# Patient Record
Sex: FEMALE | Race: OTHER | HISPANIC OR LATINO | Employment: UNEMPLOYED | URBAN - METROPOLITAN AREA
[De-identification: names, ages, dates, MRNs, and addresses within clinical notes are randomized per-mention and may not be internally consistent; named-entity substitution may affect disease eponyms.]

---

## 2020-03-24 ENCOUNTER — OFFICE VISIT (OUTPATIENT)
Dept: OBGYN CLINIC | Facility: CLINIC | Age: 40
End: 2020-03-24

## 2020-03-24 VITALS — BODY MASS INDEX: 26.36 KG/M2 | WEIGHT: 164 LBS | HEIGHT: 66 IN

## 2020-03-24 DIAGNOSIS — Z34.91 PRENATAL CARE IN FIRST TRIMESTER: Primary | ICD-10-CM

## 2020-03-24 DIAGNOSIS — Z3A.11 11 WEEKS GESTATION OF PREGNANCY: ICD-10-CM

## 2020-03-24 PROCEDURE — 87624 HPV HI-RISK TYP POOLED RSLT: CPT | Performed by: OBSTETRICS & GYNECOLOGY

## 2020-03-24 PROCEDURE — 87491 CHLMYD TRACH DNA AMP PROBE: CPT | Performed by: OBSTETRICS & GYNECOLOGY

## 2020-03-24 PROCEDURE — 87591 N.GONORRHOEAE DNA AMP PROB: CPT | Performed by: OBSTETRICS & GYNECOLOGY

## 2020-03-24 PROCEDURE — 99214 OFFICE O/P EST MOD 30 MIN: CPT | Performed by: OBSTETRICS & GYNECOLOGY

## 2020-03-24 PROCEDURE — G0145 SCR C/V CYTO,THINLAYER,RESCR: HCPCS | Performed by: OBSTETRICS & GYNECOLOGY

## 2020-03-24 NOTE — PROGRESS NOTES
OB/GYN  PRENATAL H&P VISIT  Mert Madrid  3/24/2020  2:30 PM  Dr Rigoberto Roach MD      SUBJECTIVE  Patient is a  at [address] here for initial prenatal H&P  FOB is her spouse, not currently present today  She reports LMP of 2020, giving current EGA 11w2d with JAKE 10/11/202  Patient had first trimester ultrasound completed in [place] on 2020  She was found to be 6w2d that day with JAKE 10/10/2020  Due to < 5 day difference between LMP and ultrasound, working method of dating is LMP  She is currently doing well, has some pelvic pain  She denies vaginal bleeding, leakage, abnormal discharge  Her obstetric history is notable for 3 prior vaginal deliveries  She has three sons aged 25, 12, and 8  Her first two births were in [place], her last one in the [facility]  She reports no complications from them  Her PMH is otherwise noncontributory  PSH includes cholecystectomy  She denies a family history of inheritable conditions such as physical or intellectual disabilities, birth defects, blood disorders, heart or neural tube defects  She denies hx of STD/STI, denies a hx of TB or close contacts with persons with TB  She has never had MRSA  She reports immunity to varicella  She endorses recent travel to [place] - went , returned   Denies contacts with anyone with COVID-19  Denies travel planned in the near future  She denies use of nicotine or recreational drug use  She is not currently employed,   She currently lives with , three sons  Her support system includes her family  She feels safe at home  She denies a history of mental health problems  Review of Systems   All other systems reviewed and are negative  History reviewed  No pertinent past medical history      OB History    Para Term  AB Living   4 3 3 0 0 3   SAB TAB Ectopic Multiple Live Births   0 0 0 0 3      # Outcome Date GA Lbr Maurizio/2nd Weight Sex Delivery Anes PTL Lv   4 Current 3 Term 2010    M Vag-Spont None  JENA   2 Term 2004    M Vag-Spont   JENA   1 Term 1998    M Vag-Spont   JENA       No current outpatient medications on file  Past Surgical History:   Procedure Laterality Date    CHOLECYSTECTOMY         Social History     Socioeconomic History    Marital status: /Civil Union     Spouse name: Not on file    Number of children: Not on file    Years of education: Not on file    Highest education level: Not on file   Occupational History    Not on file   Social Needs    Financial resource strain: Not on file    Food insecurity:     Worry: Not on file     Inability: Not on file    Transportation needs:     Medical: Not on file     Non-medical: Not on file   Tobacco Use    Smoking status: Never Smoker    Smokeless tobacco: Never Used   Substance and Sexual Activity    Alcohol use: Never     Frequency: Never    Drug use: Never    Sexual activity: Never     Partners: Male   Lifestyle    Physical activity:     Days per week: Not on file     Minutes per session: Not on file    Stress: Not on file   Relationships    Social connections:     Talks on phone: Not on file     Gets together: Not on file     Attends [employer] service: Not on file     Active member of club or organization: Not on file     Attends meetings of clubs or organizations: Not on file     Relationship status: Not on file    Intimate partner violence:     Fear of current or ex partner: Not on file     Emotionally abused: Not on file     Physically abused: Not on file     Forced sexual activity: Not on file   Other Topics Concern    Not on file   Social History Narrative    Not on file       OBJECTIVE  Physical Exam   Constitutional: She is oriented to person, place, and time  She appears well-developed and well-nourished  No distress  HENT:   Head: Normocephalic and atraumatic  Eyes: Conjunctivae are normal    Cardiovascular: Normal rate, regular rhythm and normal heart sounds  Pulmonary/Chest: Effort normal and breath sounds normal    Abdominal: Soft  She exhibits no distension  There is no tenderness  Musculoskeletal: Normal range of motion  Neurological: She is alert and oriented to person, place, and time  Skin: Skin is warm and dry  : Normal appearing external genitalia, vaginal mucosa, and cervix  No evidence of bleeding  Normal physiologic discharge present  On bimanual exam, uterus roughly 11 weeks in size, with no palpable adnexal masses   by Doppler    ASSESSMENT AND PLAN    46 y o , , with Ht 5' 6" (1 676 m)   Wt 74 4 kg (164 lb)   LMP 2020 , at 11w2d here for her prenatal H&P  IUP @ 11w2d  - Nursing intake completed today   - H&P completed today   - Prenatal labs ordered today, patient will complete   - Rh unknown - prenatal panel ordered   - BMI is 26  Weight today 164  Recommended weight gain is 15-25 lbs  - Reviewed prenatal care expectations, including appointment schedule, nutrition, exercise, medications, sexual intercourse, and nausea/vomiting    - Advised to call clinic and report to [place] [id number] and Delivery for acute issues throughout the pregnancy  - Precautions regarding labor, leakage, bleeding, and fetal movement reviewed  Screening   - Pap smear collected today  - G/C collected today   - Genetic screening options reviewed: will proceed with sequential screening  - MFM referral placed      Delivery Planning  - Anticipate vaginal delivery  Reviewed delivery process including potential OVD, C/S  - Sign delivery consent at 28 weeks   - Analgesia: patient may not want epidural during labor     Postpartum Planning   - Patient plans on breastfeeding  - Contraception: Different methods were discussed with patient, including progesterone-only hormonal methods (mini-pill, depo provera, Nexplanon, Mirena, Paragard); condoms, permanent male and female sterilization  Patient undecided on during postpartum phase      RTC in 4 nash Senior MD  3/24/2020  2:30 PM

## 2020-03-25 LAB
C TRACH DNA SPEC QL NAA+PROBE: NEGATIVE
N GONORRHOEA DNA SPEC QL NAA+PROBE: NEGATIVE

## 2020-03-26 LAB
HPV HR 12 DNA CVX QL NAA+PROBE: NEGATIVE
HPV16 DNA CVX QL NAA+PROBE: NEGATIVE
HPV18 DNA CVX QL NAA+PROBE: NEGATIVE

## 2020-03-27 LAB
LAB AP GYN PRIMARY INTERPRETATION: NORMAL
Lab: NORMAL

## 2020-04-03 ENCOUNTER — TELEPHONE (OUTPATIENT)
Dept: PERINATAL CARE | Facility: CLINIC | Age: 40
End: 2020-04-03

## 2020-04-06 ENCOUNTER — ROUTINE PRENATAL (OUTPATIENT)
Dept: PERINATAL CARE | Facility: CLINIC | Age: 40
End: 2020-04-06

## 2020-04-06 VITALS
HEART RATE: 88 BPM | HEIGHT: 66 IN | DIASTOLIC BLOOD PRESSURE: 72 MMHG | BODY MASS INDEX: 26.48 KG/M2 | WEIGHT: 164.8 LBS | SYSTOLIC BLOOD PRESSURE: 106 MMHG

## 2020-04-06 DIAGNOSIS — Z3A.14 14 WEEKS GESTATION OF PREGNANCY: ICD-10-CM

## 2020-04-06 DIAGNOSIS — O09.522 MULTIGRAVIDA OF ADVANCED MATERNAL AGE IN SECOND TRIMESTER: ICD-10-CM

## 2020-04-06 DIAGNOSIS — Z36.3 ENCOUNTER FOR ANTENATAL SCREENING FOR MALFORMATION USING ULTRASOUND: Primary | ICD-10-CM

## 2020-04-06 PROCEDURE — 99201 PR OFFICE OUTPATIENT NEW 10 MINUTES: CPT | Performed by: OBSTETRICS & GYNECOLOGY

## 2020-04-06 PROCEDURE — 76805 OB US >/= 14 WKS SNGL FETUS: CPT | Performed by: OBSTETRICS & GYNECOLOGY

## 2020-04-21 ENCOUNTER — PATIENT OUTREACH (OUTPATIENT)
Dept: OBGYN CLINIC | Facility: CLINIC | Age: 40
End: 2020-04-21

## 2020-04-22 ENCOUNTER — TELEMEDICINE (OUTPATIENT)
Dept: OBGYN CLINIC | Facility: CLINIC | Age: 40
End: 2020-04-22

## 2020-04-22 DIAGNOSIS — Z3A.17 17 WEEKS GESTATION OF PREGNANCY: Primary | ICD-10-CM

## 2020-04-22 DIAGNOSIS — O09.522 MULTIGRAVIDA OF ADVANCED MATERNAL AGE IN SECOND TRIMESTER: ICD-10-CM

## 2020-04-22 PROBLEM — O09.529 AMA (ADVANCED MATERNAL AGE) MULTIGRAVIDA 35+: Status: ACTIVE | Noted: 2020-04-06

## 2020-04-22 PROBLEM — Z36.9 ENCOUNTER FOR FETAL ULTRASOUND: Status: ACTIVE | Noted: 2020-04-06

## 2020-04-22 PROBLEM — Z13.79 GENETIC SCREENING: Status: ACTIVE | Noted: 2020-04-22

## 2020-04-22 PROBLEM — Z30.9 CONTRACEPTION MANAGEMENT: Status: ACTIVE | Noted: 2020-04-22

## 2020-04-22 PROCEDURE — G2012 BRIEF CHECK IN BY MD/QHP: HCPCS | Performed by: NURSE PRACTITIONER

## 2020-05-08 ENCOUNTER — APPOINTMENT (OUTPATIENT)
Dept: LAB | Facility: HOSPITAL | Age: 40
End: 2020-05-08

## 2020-05-08 DIAGNOSIS — Z3A.17 17 WEEKS GESTATION OF PREGNANCY: ICD-10-CM

## 2020-05-08 LAB
ABO GROUP BLD: NORMAL
BACTERIA UR QL AUTO: ABNORMAL /HPF
BASOPHILS # BLD AUTO: 0.03 THOUSANDS/ΜL (ref 0–0.1)
BASOPHILS NFR BLD AUTO: 0 % (ref 0–1)
BILIRUB UR QL STRIP: NEGATIVE
BLD GP AB SCN SERPL QL: NEGATIVE
CLARITY UR: ABNORMAL
COLOR UR: ABNORMAL
EOSINOPHIL # BLD AUTO: 0.08 THOUSAND/ΜL (ref 0–0.61)
EOSINOPHIL NFR BLD AUTO: 1 % (ref 0–6)
ERYTHROCYTE [DISTWIDTH] IN BLOOD BY AUTOMATED COUNT: 13.7 % (ref 11.6–15.1)
GLUCOSE UR STRIP-MCNC: NEGATIVE MG/DL
HBV SURFACE AG SER QL: NORMAL
HCT VFR BLD AUTO: 35.3 % (ref 34.8–46.1)
HCV AB SER QL: NORMAL
HGB BLD-MCNC: 12 G/DL (ref 11.5–15.4)
HGB UR QL STRIP.AUTO: NEGATIVE
IMM GRANULOCYTES # BLD AUTO: 0.1 THOUSAND/UL (ref 0–0.2)
IMM GRANULOCYTES NFR BLD AUTO: 1 % (ref 0–2)
KETONES UR STRIP-MCNC: NEGATIVE MG/DL
LEUKOCYTE ESTERASE UR QL STRIP: ABNORMAL
LYMPHOCYTES # BLD AUTO: 1.83 THOUSANDS/ΜL (ref 0.6–4.47)
LYMPHOCYTES NFR BLD AUTO: 19 % (ref 14–44)
MCH RBC QN AUTO: 32.3 PG (ref 26.8–34.3)
MCHC RBC AUTO-ENTMCNC: 34 G/DL (ref 31.4–37.4)
MCV RBC AUTO: 95 FL (ref 82–98)
MONOCYTES # BLD AUTO: 0.5 THOUSAND/ΜL (ref 0.17–1.22)
MONOCYTES NFR BLD AUTO: 5 % (ref 4–12)
MUCOUS THREADS UR QL AUTO: ABNORMAL
NEUTROPHILS # BLD AUTO: 7.07 THOUSANDS/ΜL (ref 1.85–7.62)
NEUTS SEG NFR BLD AUTO: 74 % (ref 43–75)
NITRITE UR QL STRIP: NEGATIVE
NON-SQ EPI CELLS URNS QL MICRO: ABNORMAL /HPF
NRBC BLD AUTO-RTO: 0 /100 WBCS
OTHER STN SPEC: ABNORMAL
PH UR STRIP.AUTO: 6 [PH]
PLATELET # BLD AUTO: 197 THOUSANDS/UL (ref 149–390)
PMV BLD AUTO: 10.4 FL (ref 8.9–12.7)
PROT UR STRIP-MCNC: ABNORMAL MG/DL
RBC # BLD AUTO: 3.72 MILLION/UL (ref 3.81–5.12)
RBC #/AREA URNS AUTO: ABNORMAL /HPF
RH BLD: POSITIVE
RUBV IGG SERPL IA-ACNC: 71.4 IU/ML
SP GR UR STRIP.AUTO: 1.02 (ref 1–1.03)
SPECIMEN EXPIRATION DATE: NORMAL
UROBILINOGEN UR QL STRIP.AUTO: 1 E.U./DL
WBC # BLD AUTO: 9.61 THOUSAND/UL (ref 4.31–10.16)
WBC #/AREA URNS AUTO: ABNORMAL /HPF

## 2020-05-08 PROCEDURE — 83020 HEMOGLOBIN ELECTROPHORESIS: CPT

## 2020-05-08 PROCEDURE — 87086 URINE CULTURE/COLONY COUNT: CPT | Performed by: OBSTETRICS & GYNECOLOGY

## 2020-05-08 PROCEDURE — 36415 COLL VENOUS BLD VENIPUNCTURE: CPT | Performed by: OBSTETRICS & GYNECOLOGY

## 2020-05-08 PROCEDURE — 80081 OBSTETRIC PANEL INC HIV TSTG: CPT | Performed by: OBSTETRICS & GYNECOLOGY

## 2020-05-08 PROCEDURE — 86803 HEPATITIS C AB TEST: CPT

## 2020-05-08 PROCEDURE — 81001 URINALYSIS AUTO W/SCOPE: CPT | Performed by: OBSTETRICS & GYNECOLOGY

## 2020-05-10 LAB — BACTERIA UR CULT: ABNORMAL

## 2020-05-11 LAB
DEPRECATED HGB OTHER BLD-IMP: 0 %
HGB A MFR BLD: 2.4 % (ref 1.8–3.2)
HGB A MFR BLD: 97.6 % (ref 96.4–98.8)
HGB C MFR BLD: 0 %
HGB F MFR BLD: 0 % (ref 0–2)
HGB FRACT BLD-IMP: NORMAL
HGB S BLD QL SOLY: NEGATIVE
HGB S MFR BLD: 0 %
HIV 1+2 AB+HIV1 P24 AG SERPL QL IA: NORMAL
RPR SER QL: NORMAL

## 2020-06-02 ENCOUNTER — TELEPHONE (OUTPATIENT)
Dept: PERINATAL CARE | Facility: CLINIC | Age: 40
End: 2020-06-02

## 2020-06-03 ENCOUNTER — ROUTINE PRENATAL (OUTPATIENT)
Dept: PERINATAL CARE | Facility: OTHER | Age: 40
End: 2020-06-03

## 2020-06-03 ENCOUNTER — ROUTINE PRENATAL (OUTPATIENT)
Dept: OBGYN CLINIC | Facility: CLINIC | Age: 40
End: 2020-06-03

## 2020-06-03 VITALS
TEMPERATURE: 97.5 F | WEIGHT: 176 LBS | SYSTOLIC BLOOD PRESSURE: 120 MMHG | BODY MASS INDEX: 28.28 KG/M2 | DIASTOLIC BLOOD PRESSURE: 81 MMHG | HEART RATE: 90 BPM | HEIGHT: 66 IN

## 2020-06-03 VITALS
HEART RATE: 87 BPM | BODY MASS INDEX: 28.28 KG/M2 | SYSTOLIC BLOOD PRESSURE: 125 MMHG | TEMPERATURE: 97.7 F | DIASTOLIC BLOOD PRESSURE: 80 MMHG | HEIGHT: 66 IN | WEIGHT: 176 LBS

## 2020-06-03 DIAGNOSIS — O09.522 ELDERLY MULTIGRAVIDA, SECOND TRIMESTER: Primary | ICD-10-CM

## 2020-06-03 DIAGNOSIS — Z3A.23 23 WEEKS GESTATION OF PREGNANCY: ICD-10-CM

## 2020-06-03 DIAGNOSIS — Z3A.19 19 WEEKS GESTATION OF PREGNANCY: Primary | ICD-10-CM

## 2020-06-03 DIAGNOSIS — Z34.92 PRENATAL CARE IN SECOND TRIMESTER: ICD-10-CM

## 2020-06-03 DIAGNOSIS — Z36.86 ENCOUNTER FOR ANTENATAL SCREENING FOR CERVICAL LENGTH: ICD-10-CM

## 2020-06-03 PROCEDURE — 99214 OFFICE O/P EST MOD 30 MIN: CPT | Performed by: NURSE PRACTITIONER

## 2020-06-03 PROCEDURE — 76817 TRANSVAGINAL US OBSTETRIC: CPT | Performed by: OBSTETRICS & GYNECOLOGY

## 2020-06-03 PROCEDURE — 99212 OFFICE O/P EST SF 10 MIN: CPT | Performed by: OBSTETRICS & GYNECOLOGY

## 2020-06-03 PROCEDURE — 76811 OB US DETAILED SNGL FETUS: CPT | Performed by: OBSTETRICS & GYNECOLOGY

## 2020-06-30 ENCOUNTER — TELEPHONE (OUTPATIENT)
Dept: OBGYN CLINIC | Facility: CLINIC | Age: 40
End: 2020-06-30

## 2020-07-01 ENCOUNTER — ROUTINE PRENATAL (OUTPATIENT)
Dept: OBGYN CLINIC | Facility: CLINIC | Age: 40
End: 2020-07-01

## 2020-07-01 VITALS
HEART RATE: 98 BPM | BODY MASS INDEX: 28.73 KG/M2 | DIASTOLIC BLOOD PRESSURE: 83 MMHG | WEIGHT: 178 LBS | SYSTOLIC BLOOD PRESSURE: 126 MMHG

## 2020-07-01 DIAGNOSIS — Z3A.27 27 WEEKS GESTATION OF PREGNANCY: Primary | ICD-10-CM

## 2020-07-01 PROCEDURE — 99213 OFFICE O/P EST LOW 20 MIN: CPT | Performed by: OBSTETRICS & GYNECOLOGY

## 2020-07-01 NOTE — PROGRESS NOTES
OB/GYN prenatal visit    S: 45 y o  [id number] 27w1d here for PN visit  Russian-speaking patient, she was here with her daughter in law  She has no obstetric complaints, including pelvic pain, contractions, vaginal bleeding, loss of fluid, or decreased fetal movement       COVID19 Screen:    Cough: no  Fever: no  Shortness of breath: no  Known exposures to COVID-19, or international travel:no    O:  Vitals:    20 1335   BP: 126/83   Pulse: 98       Gen: no acute distress, nonlabored breathing  Fundal Height (cm): 27 cm  Fetal Heart Rate: 146    A/P:    Problem List Items Addressed This Visit     None      Visit Diagnoses     27 weeks gestation of pregnancy    -  Primary    Relevant Orders    Glucose, 1H PG        -advanced maternal age; she did not have genetic screening which referred by [facility]  -blood pressure today 126/83  - labor precautions discussed with the patient  -preeclampsia, meaning, follow-up, signs and symptoms discussed  -28 weeks labs ordered  -patient all questions answered, will see her in 2 weeks   -she will receive TDAP at next visit    28-32 weeks: tdap, flu vaccine, sign consent form, check in card, Emmett Alexis MD  3/3/9240  9:24 PM

## 2020-07-16 ENCOUNTER — APPOINTMENT (OUTPATIENT)
Dept: LAB | Facility: HOSPITAL | Age: 40
End: 2020-07-16

## 2020-07-16 DIAGNOSIS — Z3A.27 27 WEEKS GESTATION OF PREGNANCY: ICD-10-CM

## 2020-07-16 LAB — GLUCOSE 1H P 50 G GLC PO SERPL-MCNC: 157 MG/DL

## 2020-07-16 PROCEDURE — 82950 GLUCOSE TEST: CPT

## 2020-07-16 PROCEDURE — 36415 COLL VENOUS BLD VENIPUNCTURE: CPT

## 2020-07-21 ENCOUNTER — TELEPHONE (OUTPATIENT)
Dept: OTHER | Facility: HOSPITAL | Age: 40
End: 2020-07-21

## 2020-07-21 ENCOUNTER — TELEPHONE (OUTPATIENT)
Dept: OBGYN CLINIC | Facility: CLINIC | Age: 40
End: 2020-07-21

## 2020-07-21 DIAGNOSIS — Z3A.30 30 WEEKS GESTATION OF PREGNANCY: Primary | ICD-10-CM

## 2020-07-21 NOTE — PROGRESS NOTES
OB/GYN  PN Visit  [facility]  [id number]  7/22/2020  7:21 PM      S: Pt is a 45 y o  [id number] woman at 30w0d  by 14w U/S here for PN visit  She denies contractions  She denies leakage of fluid and vaginal bleeding  She endorses good fetal movement  Her pregnancy is complicated by advanced maternal age  Pt had an elevated diastolic BP today (125/65) and trace protein in her urine  A repeat BP 1 hour later was normal (115/79)  Received 28 week education and TDAP today  Desires Tubal Ligation  Discussed Bilateral Tubal Ligation and MA-31 form with her today  Patient understands that BTL is intended for permanent sterilization and is not intended to be a reversible form of birth control  Patient understands that though this procedure is intended to provide permanent sterilization, there is still a chance for failure of the procedure and that she may become pregnant in the future despite BTL  She understands that with BTL there exists an increased risk of ectopic pregnancy were she to become pregnant postprocedure, and that this is considered an abnormal pregnancy and would likely result in miscarriage or termination, and that ectopic pregnancy poses a risk to her maternal health and well-being including a risk of death  Patient understands that there are alternative forms of birth control including abstinence, condoms or other barrier methods, OCPs, depo provera injection, ring, patch, Nexplanon, IUD - all of which are reversible and would allow for attempt of future pregnancy  She has declined these methods of birth control and does not desire future fertility          Dating:  LMP 1/5/20 --> JAKE 10/11/20  US on 2/17/20 --> JAKE 10/10/20   [facility]S @ [facility] --> JAKE 9/29/20  Working JAKE --> 9/29/20 by 14w U/S    Plan: breast / tubal ligation    Used [employer]  #[id number]      O:  Vitals:    07/22/20 1449   BP: 115/79   Pulse:    Temp:      Physical Exam  Fundal height: 150  FHT: 29 cm A/P:  Problem List Items Addressed This Visit        Other    Elderly multigravida, second trimester    30 weeks gestation of pregnancy - Primary    Prenatal care in third trimester    Relevant Orders    TDAP Vaccine greater than or equal to 8yo (Completed)    Proteinuria affecting pregnancy in third trimester    Relevant Orders    CBC and Platelet    Comprehensive metabolic panel    Protein / creatinine ratio, urine            Future Appointments   Date Time Provider Isabel Ml   2020 11:00 AM  [address]   2020 11:15 AM RAFAEL Nguyen  [facility]      Prenatal Visit  - Pt w/ elevated dBP and trace protein in her urine today  - PEC labs sent today    Desire for permanent sterilization  - MA-31 signed today    D/w Dr Bubba Tobar MD  OB/GYN PGY-1  2020  7:21 PM

## 2020-07-22 ENCOUNTER — ROUTINE PRENATAL (OUTPATIENT)
Dept: OBGYN CLINIC | Facility: CLINIC | Age: 40
End: 2020-07-22

## 2020-07-22 VITALS
SYSTOLIC BLOOD PRESSURE: 115 MMHG | WEIGHT: 180 LBS | BODY MASS INDEX: 29.05 KG/M2 | HEART RATE: 93 BPM | TEMPERATURE: 97.6 F | DIASTOLIC BLOOD PRESSURE: 79 MMHG

## 2020-07-22 DIAGNOSIS — O12.13 PROTEINURIA AFFECTING PREGNANCY IN THIRD TRIMESTER: ICD-10-CM

## 2020-07-22 DIAGNOSIS — Z34.93 PRENATAL CARE IN THIRD TRIMESTER: ICD-10-CM

## 2020-07-22 DIAGNOSIS — O09.522 ELDERLY MULTIGRAVIDA, SECOND TRIMESTER: ICD-10-CM

## 2020-07-22 DIAGNOSIS — Z3A.30 30 WEEKS GESTATION OF PREGNANCY: Primary | ICD-10-CM

## 2020-07-22 PROCEDURE — 90715 TDAP VACCINE 7 YRS/> IM: CPT

## 2020-07-22 PROCEDURE — 96372 THER/PROPH/DIAG INJ SC/IM: CPT | Performed by: OBSTETRICS & GYNECOLOGY

## 2020-07-22 PROCEDURE — 90471 IMMUNIZATION ADMIN: CPT

## 2020-07-22 PROCEDURE — 99215 OFFICE O/P EST HI 40 MIN: CPT | Performed by: OBSTETRICS & GYNECOLOGY

## 2020-07-29 ENCOUNTER — APPOINTMENT (OUTPATIENT)
Dept: LAB | Facility: HOSPITAL | Age: 40
End: 2020-07-29

## 2020-07-29 DIAGNOSIS — Z3A.27 27 WEEKS GESTATION OF PREGNANCY: ICD-10-CM

## 2020-07-29 DIAGNOSIS — Z34.93 PRENATAL CARE IN THIRD TRIMESTER: Primary | ICD-10-CM

## 2020-07-29 LAB
BASOPHILS # BLD AUTO: 0.02 THOUSANDS/ΜL (ref 0–0.1)
BASOPHILS NFR BLD AUTO: 0 % (ref 0–1)
EOSINOPHIL # BLD AUTO: 0.08 THOUSAND/ΜL (ref 0–0.61)
EOSINOPHIL NFR BLD AUTO: 1 % (ref 0–6)
ERYTHROCYTE [DISTWIDTH] IN BLOOD BY AUTOMATED COUNT: 12.7 % (ref 11.6–15.1)
HCT VFR BLD AUTO: 36.2 % (ref 34.8–46.1)
HGB BLD-MCNC: 12.9 G/DL (ref 11.5–15.4)
IMM GRANULOCYTES # BLD AUTO: 0.13 THOUSAND/UL (ref 0–0.2)
IMM GRANULOCYTES NFR BLD AUTO: 1 % (ref 0–2)
LYMPHOCYTES # BLD AUTO: 1.51 THOUSANDS/ΜL (ref 0.6–4.47)
LYMPHOCYTES NFR BLD AUTO: 16 % (ref 14–44)
MCH RBC QN AUTO: 34.1 PG (ref 26.8–34.3)
MCHC RBC AUTO-ENTMCNC: 35.6 G/DL (ref 31.4–37.4)
MCV RBC AUTO: 96 FL (ref 82–98)
MONOCYTES # BLD AUTO: 0.49 THOUSAND/ΜL (ref 0.17–1.22)
MONOCYTES NFR BLD AUTO: 5 % (ref 4–12)
NEUTROPHILS # BLD AUTO: 6.97 THOUSANDS/ΜL (ref 1.85–7.62)
NEUTS SEG NFR BLD AUTO: 77 % (ref 43–75)
NRBC BLD AUTO-RTO: 0 /100 WBCS
PLATELET # BLD AUTO: 177 THOUSANDS/UL (ref 149–390)
PMV BLD AUTO: 10.4 FL (ref 8.9–12.7)
RBC # BLD AUTO: 3.78 MILLION/UL (ref 3.81–5.12)
WBC # BLD AUTO: 9.2 THOUSAND/UL (ref 4.31–10.16)

## 2020-07-29 PROCEDURE — 86592 SYPHILIS TEST NON-TREP QUAL: CPT

## 2020-07-29 PROCEDURE — 85025 COMPLETE CBC W/AUTO DIFF WBC: CPT

## 2020-07-30 LAB — RPR SER QL: NORMAL

## 2020-08-03 ENCOUNTER — APPOINTMENT (OUTPATIENT)
Dept: LAB | Facility: HOSPITAL | Age: 40
End: 2020-08-03

## 2020-08-03 DIAGNOSIS — Z3A.30 30 WEEKS GESTATION OF PREGNANCY: Primary | ICD-10-CM

## 2020-08-03 LAB — GLUCOSE P FAST SERPL-MCNC: 100 MG/DL (ref 65–99)

## 2020-08-03 PROCEDURE — 36415 COLL VENOUS BLD VENIPUNCTURE: CPT

## 2020-08-03 PROCEDURE — 82951 GLUCOSE TOLERANCE TEST (GTT): CPT

## 2020-08-04 ENCOUNTER — TELEPHONE (OUTPATIENT)
Dept: PERINATAL CARE | Facility: CLINIC | Age: 40
End: 2020-08-04

## 2020-08-04 NOTE — TELEPHONE ENCOUNTER
Attempted to reach patient by phone and left voicemail to confirm appointment for MFM ultrasound  1 support person ( must be over the age of 15) may accompany you for your appointment  If you or your support person have traveled outside the state in the past 2 weeks, please call and notify our office today #[phone]  You and your support person must wear a mask ,covering nose and mouth,during your entire visit  You and your support person will have temperature screened upon arrival     To minimize your exposure in our waiting room, please call our office prior to entering the building  Check in and rooming questions will be done via phone  We will give you directions when to enter for your appointment  Inside office # provided:  [place] [facility] line: [phone]  [place] line:  [phone]  [facility] line:  [phone] line:  [phone]  [place] Schafer line:  [phone]  [place] line:  [phone]    IF you are not feeling well- cough, fever, shortness of breath or any flu like symptoms, contact your primary care physician or [phone] [facility]    Any questions with these instructions please call Maternal Fetal Medicine nurse line today @ # [phone]

## 2020-08-05 ENCOUNTER — ULTRASOUND (OUTPATIENT)
Dept: PERINATAL CARE | Facility: CLINIC | Age: 40
End: 2020-08-05

## 2020-08-05 VITALS
DIASTOLIC BLOOD PRESSURE: 74 MMHG | HEIGHT: 66 IN | BODY MASS INDEX: 28.99 KG/M2 | HEART RATE: 84 BPM | WEIGHT: 180.4 LBS | SYSTOLIC BLOOD PRESSURE: 110 MMHG | TEMPERATURE: 98.5 F

## 2020-08-05 DIAGNOSIS — O09.523 ELDERLY MULTIGRAVIDA, THIRD TRIMESTER: ICD-10-CM

## 2020-08-05 DIAGNOSIS — O24.410 DIET CONTROLLED GESTATIONAL DIABETES MELLITUS (GDM) IN THIRD TRIMESTER: Primary | ICD-10-CM

## 2020-08-05 DIAGNOSIS — Z3A.32 32 WEEKS GESTATION OF PREGNANCY: ICD-10-CM

## 2020-08-05 PROCEDURE — 76816 OB US FOLLOW-UP PER FETUS: CPT | Performed by: OBSTETRICS & GYNECOLOGY

## 2020-08-05 PROCEDURE — 99213 OFFICE O/P EST LOW 20 MIN: CPT | Performed by: OBSTETRICS & GYNECOLOGY

## 2020-08-05 NOTE — LETTER
August 5, 2020     Dennis Hassan, [address] [facility]    Patient: Edwin Conklin   YOB: 1980   Date of Visit: 8/5/2020       Dear Dr Kaila Chavez:    Thank you for referring Edwin Conklin to me for evaluation  Below are my notes for this consultation  If you have questions, please do not hesitate to call me  I look forward to following your patient along with you  Sincerely,        Sherri Pagan MD        CC: No Recipients  Sherri Pagan MD  8/5/2020 12:03 PM  Incomplete  Edwin Conklin has no complaints today  She was with her 15-year-old son who she asked to interpret for her and she declined the language line  She reports regular fetal movements  Review of her records showed that she failed her 3 hour glucose tolerance test with a fasting blood sugar of 100  She reports she has a history of 3 prior babies with the same father as this pregnancy and they weighed 8 pounds to 8 pounds 9 ounces  Problem list:  1  GDM recently diagnosed  2  Advanced maternal age-she declined genetic screening    Ultrasound findings: The ultrasound today shows normal interval fetal growth in the 73rd percentile and the fetal measurements are symmetric  Her NAIN is normal   Her last ultrasound was limited in the area of the short axis, IVC, right femur and left hand which were seen today and appear normal   This completes her anatomical survey  Pregnancy ultrasound has limitations and is unable to detect all forms of fetal congenital abnormalities  The inaccuracy in the EFW can be off by 1 lb either way in the third trimester  Follow up:  US for growth and fluid are appropriate today  I will refer her to diabetes Education  If 20% or more FBS are >95 or 2 hr pp are >120 she will be started on insulin or a oral hypoglycemic such as metformin or glyburide       If medications are required to control her diabetes she will require twice weekly fetal testing with NST's from 32 weeks on  I would also recommend delivery around her due date  If she does not require any medications to control her diabetes then she can start fetal testing at 40 weeks and be delivered by 41 weeks  I recommend a repeat ultrasound for growth in 4 weeks  I will schedule this here  The majority of time (greater then 50%) was spent on counseling and coordination of care of this patient and /or family member  Approximate face to face time was 15 minutes      Lupillo Marie MD

## 2020-08-05 NOTE — PROGRESS NOTES
Cheko Laura has no complaints today  She was with her 14-year-old son who she asked to interpret for her and she declined the language line  She reports regular fetal movements  Review of her records showed that she failed her 3 hour glucose tolerance test with a fasting blood sugar of 100  She reports she has a history of 3 prior babies with the same father as this pregnancy and they weighed 8 pounds to 8 pounds 9 ounces  Problem list:  1  GDM recently diagnosed  2  Advanced maternal age-she declined genetic screening    Ultrasound findings: The ultrasound today shows normal interval fetal growth in the 73rd percentile and the fetal measurements are symmetric  Her NAIN is normal   Her last ultrasound was limited in the area of the short axis, IVC, right femur and left hand which were seen today and appear normal   This completes her anatomical survey  Pregnancy ultrasound has limitations and is unable to detect all forms of fetal congenital abnormalities  The inaccuracy in the EFW can be off by 1 lb either way in the third trimester  Follow up:  US for growth and fluid are appropriate today  I will refer her to diabetes Education  If 20% or more FBS are >95 or 2 hr pp are >120 she will be started on insulin or a oral hypoglycemic such as metformin or glyburide  If medications are required to control her diabetes she will require twice weekly fetal testing with NST's from 32 weeks on  I would also recommend delivery around her due date  If she does not require any medications to control her diabetes then she can start fetal testing at 40 weeks and be delivered by 41 weeks  I recommend a repeat ultrasound for growth in 4 weeks  I will schedule this here  The majority of time (greater then 50%) was spent on counseling and coordination of care of this patient and /or family member  Approximate face to face time was 15 minutes      Lindajo Alpers, MD

## 2020-08-05 NOTE — Clinical Note
Newly found gdm  Needs set up with diabetes education and nutrition counseling  Patient speaks Urdu      Marylen Bryant, MD

## 2020-08-06 ENCOUNTER — ROUTINE PRENATAL (OUTPATIENT)
Dept: OBGYN CLINIC | Facility: CLINIC | Age: 40
End: 2020-08-06

## 2020-08-06 ENCOUNTER — PATIENT OUTREACH (OUTPATIENT)
Dept: OBGYN CLINIC | Facility: CLINIC | Age: 40
End: 2020-08-06

## 2020-08-06 VITALS
DIASTOLIC BLOOD PRESSURE: 77 MMHG | HEART RATE: 85 BPM | TEMPERATURE: 97.6 F | BODY MASS INDEX: 29.28 KG/M2 | WEIGHT: 181.4 LBS | SYSTOLIC BLOOD PRESSURE: 115 MMHG

## 2020-08-06 DIAGNOSIS — Z34.93 PRENATAL CARE IN THIRD TRIMESTER: ICD-10-CM

## 2020-08-06 DIAGNOSIS — O24.410 DIET CONTROLLED GESTATIONAL DIABETES MELLITUS (GDM) IN THIRD TRIMESTER: ICD-10-CM

## 2020-08-06 DIAGNOSIS — Z3A.32 32 WEEKS GESTATION OF PREGNANCY: Primary | ICD-10-CM

## 2020-08-06 PROCEDURE — 99215 OFFICE O/P EST HI 40 MIN: CPT | Performed by: NURSE PRACTITIONER

## 2020-08-06 NOTE — PROGRESS NOTES
Assessment & Plan  45 y o  [id number] at [place] [address] presenting for routine prenatal visit  PT has been Dx as having GDM pt has a referral to see diabetes educator  Pt's spouse is diabetic and states he will help her with testing  Discussed timing of testing and normal rannge of blood sugars  Explained she should keep appointment with diabetes educator for education  Pt and spouse are concerned where she will deliver since they live in [place], pt saw Rigo Veloz from social work today  Pt currently does not have insurance and would like a sterilization procedure, will discussed with  and financial counselor  Pt previously signed MA sterilization form  WNL respiratory effort  Negative megan, cough or SOB    Problem List Items Addressed This Visit        Endocrine    Diet controlled gestational diabetes mellitus (GDM) in third trimester       Other    32 weeks gestation of pregnancy - Primary    Prenatal care in third trimester        ____________________________________________________________  Subjective  She is without complaint  She denies contractions, loss of fluid, or vaginal bleeding  She feels regular fetal movements      Objective  /77   Pulse 85   Temp 97 6 °F (36 4 °C)   Wt 82 3 kg (181 lb 6 4 oz)   LMP 01/05/2020   BMI 29 28 kg/m²          Patient's Active Problem List  Patient Active Problem List   Diagnosis    Elderly multigravida, third trimester    32 weeks gestation of pregnancy    Prenatal care in third trimester    Diet controlled gestational diabetes mellitus (GDM) in third trimester     Plan  Continue to monitor blood sugars and keep in contact with the diabetes educator  Continue contact with the   To call with vaginal bleeding, loss of fluid, regular contractions, decreased fetal movement, other needs or concerns  Retun 2 weeks  Pt and spouse verbalized understanding of all discussed

## 2020-08-06 NOTE — PATIENT INSTRUCTIONS
The Third Trimester  (28-42 weeks)  YOUR BABY   * your baby sucks its thumb now! * your baby can hear voices and respond to touch   so talk to him or her!!   * your babys brain grows and develops most in the last 2 months of pregnancy   * babys head and bones are soft and flexible so they can fit through the birth canal   * babys movements change towards the end of pregnancy because there is less room for kicking and stretching in your belly   * babys lungs are not fully developed and completely ready to breathe on their own until the last 3-4 weeks before your due date    YOUR BODY   * your belly is growing a lot now   * it may become more difficult to sleep well at night or to be as active as you usually are   * you may sweat more than usual   * you will become more off-balancebe careful not to fall!!   * you may develop hemorrhoids (which can be painful and make it difficult to sit down)   * the last two months of pregnancy can become very uncomfortablewith backaches, headaches, and heartburn   * you can start to have contractions  as long as they are irregular and less than 5 per hour, this is a normal part of your body getting ready to have a baby   * your cervix may start to thin out and open upto get ready for delivery   * you may find yourself needing to pee very often  because baby is pressing on your bladder so much   * you may get out of breathe more quickly than usual      FETAL KICK COUNTS    In the third trimester (after 28 weeks gestation) you should be performing fetal kick counts every day  Your baby should move at least 10 times in 2 hours during an active time, once a day  Choose atime of day when your baby is most active  Try to do this around the same time each day  Get into a comfortable position and then write down the time your baby first moves  Count each movement until the baby moves 10 times  These movements include kicks, punches, nudges, flutters, or rolls    This can take anywhere from 5 minutes to 2 hours  Write down the time you feel the baby's 10th movement  If 2 hours has passed and your baby has not moved at least 10 times, you should CALL THE OFFICE RIGHT AWAY  [phone]  PREMATURE LABOR     When to call [phone]:  * I need to call immediately if I have even a small amount of LIQUID leaking from my vagina, with or without contractions  * I need to call if I am BLEEDING from my vagina  * I need to call if I am feeling CRAMPING that continues after drinking 2-3 glasses of water and lying down on my side for one hour and that feels like I am having a period  * I need to call if I feel CONTRACTIONS  more than 4 times in an hour that feels like the baby is balling up even after I try drinking 2-3 glasses of water and lying down on my side for an hour  * I need to call if I notice a change in my vaginal DISCHARGE  * I need to call if I am feeling PELVIC PRESSURE  that feels like the baby is pushing down into my vagina and lasts more than an hour  * I need to call if I have LOW BACKACHE which is new and near my tailbone  It may either come and go several times during an hour or stay there constantly  PRE-ECLAMPSIA     What is it? Pre-eclampsia is a serious disease that can occur during pregnancy related to high blood pressures  It can happen to any woman  Why should I care? Women who develop pre-eclampsia have serious risks which can include seizures, stroke, organ damage, premature birth of their baby  In the very worst cases, it can cause death of the mother and/or their baby  What should I pay attention to?    Signs and symptoms of pre-eclampsia can include:   * Severe swelling of face or hands    * A headache that will not go away even after you have taken Tylenol   * Seeing spots or changes in eyesight    * Pain in the upper abdomen or shoulder    * New nausea and vomiting (in the second half of pregnancy)    * Sudden weight gain    * Difficulty breathing     What should I do? If you experience any of the above symptoms of pre-eclampsia, contact your OB provider  Finding pre-eclampsia early is important for you and your baby  Call us at [phone]  Kathia Arevalo BREASTFEEDING     BENEFITS FOR BABIES   * stronger immune systems (less allergies, eczema, asthma, and childhood cancers)   * less diarrhea and constipation or other GI diseases   * fewer colds and ear infections   * better vision and teeth (fewer cavities)   * improves IQ   * lower rates of diabetes and obesity in childhood     BENEFITS FOR MOMS   * promotes faster weight loss after delivery   * lower risk for postpartum depression   * lower risk for breast, uterine, and ovarian cancers   * lower risk for osteoporosis developing with age   * easier than formula - is always right with you, clean, and the right temperature   * less expensive than formulaits FREE !!!!     KEYS TO SUCCESSFUL BREASTFEEDING   * keep baby skin-to-skin until after first feeding event   * keep baby in your room with you during your hospital stay after delivery   * avoid any bottle feedings (unless medically necessary)   * limit the use of pacifiers and swaddling   * ask for help if you are having any issueslactation consultants (who specialize in breastfeeding) are available to help you   * a healthy diet for momeating a variety of foods and portions in moderation    THINGS YOU SHOULD KNOW ABOUT BREASTFEEDING   * most medications are considered compatible with breastfeeding by the [address] [place] [address] Academy of Pediatrics, but you should check with your health care provider or lactation consultant prior to taking a new medicationjust to be sure it is safe   * alcohol (beer, wine, liquor) can be passed from mother to baby through breast milkan occasional, social drink is deemed acceptable by the American Academy of [place] [address]   more than that should be avoided   * breastfeeding is NOT an effective method of birth control   * nicotine (in cigarettes) can pass from mother to baby through breast milk   however, for mothers who smoke, it is still healthier to breastfeed than use formula   * caffeine should be limited to 1-3 cups per dayincludes coffee, soda, energy drinks         PERINEAL / VAGINAL MASSAGE    What can I do now to decrease my chances of tearing during delivery? Massaging around the vaginal opening by you (or your partner), either antepartum (before birth) or during the second stage of labor, can reduce the likelihood of perineal tearing during childbirth  Likewise, the use of warm packs held on the perineum during the pushing stage of labor can reduce the severity of your tear  This will happen during the pushing stage of labor  At home, you can also help reduce the chances of injury that may occur during the birth of your child through perineal massage  When should I do this? Starting around or shortly after 34 weeks of pregnancy, you or your partner should provide 5-10 minutes of vaginal massage 1-4 times per week  How? Use either almond, coconut, or olive oil and water mixture on 1 or 2 fingers (depending on comfort)  Insert finger(s) 3-5cm into the vagina  Apply sweeping downward/sideward pressure from 3 to 9 o'clock for 5-10 minutes, 1-4 times per week  WARNING SIGNS DURING PREGNANCY  Call our office at [phone] if you experience any of the followin  Vaginal bleeding  2  Sharp abdominal pain that does not go away  3  Fever (more than 100 4 and is not relieved by Tylenol)  4  Persistent vomiting lasting greater than 24 hours  5  Chest pain   6  Pain or burning when you urinate  7  Severe headache that doesn't resolve with Tylenol  8  Blurred vision or seeing spots in your vision  9  Sudden swelling of your face or hands  10  Redness, swelling or pain in a leg  11  A sudden weight gain in just a few days  12   Decrease in your baby's movement (after 28 weeks or the 6th month of pregnancy)  13  A loss of watery fluid from your vagina - can be a gush, a trickle or continuous wetness  14  After 20 weeks of pregnancy, rhythmic cramping (greater than 4 per hour) or menstrual like low/pelvic pain          VACCINES IN PREGNANCY    TDAP  Whopping cough (or pertusSsis) can be serious for anyone, but for your , it can be life-threatning  Up to 20 babies die each year in the [place]  Due to whopping cough  About half of babies younger than 3year old who get whopping cough need treatment in the hospital   The younger the baby is when he or she gets whopping cough, the more likely he or she will need to be treated in a hospital   When you receive the whopping cough vaccine (Tdap) during your pregnancy, your body will create protective antibodies and pass some of them to your baby before birth  These antibodies can help protect your baby from getting whopping cough until they are old enough to be vaccinated themselves (usually around 7 months of age)  INFLUENZA  Changes in your immune, heart, and lung functions during pregnancy make you more likely to get seriously ill from the flu  Catching the flu also increases your chances for serious problems for your developing baby, including premature labor and delivery  It is recommended that all women who are pregnant during flu season should receive an influenza vaccine  Coronavirus (YEYPW-51) pregnancy FAQs: Medical experts answer your questions  From [employer] cy  com/0_coronavirus-covid-19-pregnancy-faq-medical-gqtcwau-ucyqud-fm_[email or web address]  bc (courtesy of [facility])  As of 3/18/20  By Wilbert Larsen 40  Medically reviewed by Society for Maternal-Fetal Medicine       We asked parents-to-be to send us their most pressing questions about coronavirus (COVID-19)  Among them: Is it still safe to deliver in a hospital? What if my ob-gyn has the virus?  We sent those questions to the top docs at the Society for Maternal-Fetal Medicine  Here are their answers  The coronavirus (COVID-19) pandemic has been declared a national emergency in the [facility] by [employer]  Moms-to-be like you are concerned about everything from getting medicines to managing disruptions at work  But above and beyond any worry about lifestyle changes is a focus on your health and the impact of COVID-19 on your pregnancy  We asked obstetrics doctors who handle the most complicated pregnancy issues to answer your questions about the coronavirus  Here are their responses, provided by Dr Katie Hong and her colleagues at the Society for [employer]  Am I at more risk for COVID-19 if I'm pregnant? We don't know  Pregnancy does change your immune system in ways that might make you more susceptible to viral respiratory infections like COVID-19  If you become infected, you might also be at higher risk for more severe illness compared to the general population  Although this does not appear to be the case with COVID-19, based on limited data so far, a higher risk has been true for pregnant women with other coronavirus infections (SARS-CoV and MERS-CoV) and other viral respiratory infections, such as flu  It's important to take preventive actions to avoid infection, such as washing your hands often and avoiding people who are sick  How might coronavirus affect my pregnancy? Again, we don't know  Women with other coronavirus infections (such as SARS-CoV) did not have miscarriage or stillbirth at higher rates than the general population  We know that having other respiratory viral infections during pregnancy, such as flu, has been associated with problems like low birth weight and  birth  Also, having a high fever early in pregnancy may increase the risk of certain birth defects  Could I transmit coronavirus to my baby during pregnancy or delivery?   We don't know whether you could transmit COVID-19 to your baby before or during delivery  However, among the few case studies of infants born to mothers with COVID-23 published in peer-reviewed literature, none of the infants tested positive for the virus  Also, there was no virus detected in samples of amniotic fluid or breast milk  There have been a few reports of newborns as young as a few days old with infection, suggesting that a mother can transmit the infection to her infant through close contact after delivery  There have been no reports of mother-to-baby transmission for other coronaviruses (MERS-CoV and SARS-CoV), although only limited information is available  Is it safe for me to deliver at a hospital where there have been COVID-19 cases? Yes  We know that COVID-19 is a very scary virus  The good news is that hospitals are taking great precautions to keep patients and healthcare providers safe  When a patient is even suspected to have COVID-19, they are placed in a negative pressure room  (Think of these rooms as vacuums that suck and filter the air so it's safe for the other people in the hospital)  This makes it possible for you to deliver at the hospital without putting you or your baby at risk  Hospitals are also implementing stricter visiting policies to keep patients safe  It's worth calling your hospital to check if there are any new regulations to be aware of  What plans should I make now in case the hospital system is overwhelmed when it's time for me to deliver? 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 What if I work in a school, the travel [address], or some other high-risk setting?   Healthcare facilities should take care to limit the exposure of pregnant employees to patients with confirmed or suspected COVID-19, just as they would with other infectious cases  If you continue working, be sure to follow the CDC's risk assessment and infection control guidelines  If you work in a school, travel [employer], or other high-risk setting, talk with your employer about what it's doing to protect employees and minimize infection risks  Wash your hands often  What if my OB gets COVID-19? If your doctor or midwife tests positive for COVID-19, they will need to be quarantined until they recover and are no longer at risk of transmitting the virus  In this case, you'll be assigned to another OB in your doctor's practice (or you may choose another practitioner yourself)  Ask your new OB or your doctor's office if you should self-quarantine or be tested for the virus  (It will depend on when you last saw your provider and when that person tested positive )    Should we hold off on trying to conceive because of COVID-19? At this time, there's no reason to hold off on trying to get pregnant, but the data we have is really limited  For example, we don't think the virus causes birth defects or increases your risk of miscarriage  But we don't know whether you could transmit COVID-19 to your baby before or during delivery  We also don't know if the virus lives in semen or can be sexually transmitted  We have a babymoon scheduled in the next few months - should we cancel? If you're planning to travel internationally or on a cruise, you should strongly consider canceling  At this time, the virus has reached more than 140 countries, and there are travel bans to [place], most of [place], and [place] [employer] [place]  Places where large numbers of people gather are at highest risk, especially airports and cruise ships  If you're planning travel in the [place] , note that any travel setting increases your risk of exposure, and there may be travel bans even in [place] by the time you're scheduled to go   Even if you're state is not blocked, you'll definitely want to avoid traveling to communities where the virus is spreading  To find out where these places are, check The [place] Times map based on CDC data  For the most current advice to help you avoid exposure, check the CDC's COVID-19 travel page  Will the hospital separate me from my  and keep the baby in quarantine? If you test positive for COVID-19 or have been exposed but have no symptoms, the CDC, [employer] of Obstetricians and Gynecologists, and the Society for [place] [employer] all recommend that you be  from your baby to decrease the risk of transmission to the baby  This would last until you are no longer at risk of transmitting the virus  If you do not have COVID-19 and have not been exposed to the virus, the hospital will not separate you from your   My hospital is restricting visitors and only allowing one support person  If my support person leaves after the delivery, will they be allowed to come back? Every hospital has different policies  Contact your hospital or labor and delivery unit a week or so before delivery to get the most up-to-date restrictions  In general, if your support person needs to leave, they would be allowed back unless they knew they were exposed to COVID-19 after leaving your company  [facility] understands that the coronavirus (COVID-19) pandemic is an evolving story and that your questions will change over time  We'll continue asking moms and dads in our Community what they want to know, and we'll get the answers from experts to keep them - and you - informed and supported  My mom was planning to fly here to help me care for my new baby after delivery  Should I tell her not to come? Yes  If your mom is over 61 or has any serious chronic medical conditions (such as heart disease, lung disease, or diabetes), she is at higher risk of serious illness from COVID-19 and should avoid air travel   And remember that any travel setting increases a person's risk of exposure  So, it may be risky to have her around the baby after she has been traveling  For the most current advice on traveling, check the CDC's COVID-19 travel page  [facility] understands that the coronavirus pandemic is an evolving story and that your questions will change over time  We'll continue asking moms and dads in our Community what they want to know, and we'll get the answers from experts to keep them - and you - informed and supported      Continue to monitor blood sugars and keep in contact with the diabetes educator  Continue contact with the   Retun 2 weeks

## 2020-08-10 ENCOUNTER — PATIENT OUTREACH (OUTPATIENT)
Dept: OBGYN CLINIC | Facility: CLINIC | Age: 40
End: 2020-08-10

## 2020-08-10 NOTE — PROGRESS NOTES
DEMARIO MARES consulted to assist pt in her insurance needs  Pt states she spoke to someone regarding Medical Assistance but is unsure of what the status of this is  DEMARIO provided pt with information on financial counselor [phone] to call to determine status of this  Once MA is obtained, pt will much easier be able to find a hospital closer to where she lives  DEMARIO also provided pt with information on hospitals closer to her as well  DEMARIO MARES provided contact information and will remain available for further consult as needed

## 2020-08-10 NOTE — PROGRESS NOTES
DEMARIO MARES outreached pt to confirm that she has spoken to [place] , [facility]  Pt states she spoke with her after speaking with CM last week  DEMARIO provided financial counselor phone number as well as DEMARIO CM phone number for any follow up care needs  DEMARIO MARES to remain available for further consult as needed

## 2020-08-17 ENCOUNTER — TELEMEDICINE (OUTPATIENT)
Dept: PERINATAL CARE | Facility: CLINIC | Age: 40
End: 2020-08-17

## 2020-08-17 ENCOUNTER — DOCUMENTATION (OUTPATIENT)
Dept: PERINATAL CARE | Facility: CLINIC | Age: 40
End: 2020-08-17

## 2020-08-17 DIAGNOSIS — O24.410 DIET CONTROLLED GESTATIONAL DIABETES MELLITUS (GDM) IN THIRD TRIMESTER: Primary | ICD-10-CM

## 2020-08-17 DIAGNOSIS — Z3A.33 33 WEEKS GESTATION OF PREGNANCY: Primary | ICD-10-CM

## 2020-08-17 DIAGNOSIS — O24.410 DIET CONTROLLED GESTATIONAL DIABETES MELLITUS (GDM) IN THIRD TRIMESTER: ICD-10-CM

## 2020-08-17 PROCEDURE — G0108 DIAB MANAGE TRN  PER INDIV: HCPCS | Performed by: DIETITIAN, REGISTERED

## 2020-08-17 RX ORDER — BLOOD SUGAR DIAGNOSTIC
STRIP MISCELLANEOUS
Qty: 100 EACH | Refills: 3 | Status: SHIPPED | OUTPATIENT
Start: 2020-08-17 | End: 2020-09-27 | Stop reason: HOSPADM

## 2020-08-17 RX ORDER — BLOOD-GLUCOSE METER
EACH MISCELLANEOUS
Qty: 1 KIT | Refills: 0 | Status: CANCELLED | OUTPATIENT
Start: 2020-08-17

## 2020-08-17 RX ORDER — BLOOD SUGAR DIAGNOSTIC
STRIP MISCELLANEOUS
Qty: 100 EACH | Refills: 3 | Status: CANCELLED | OUTPATIENT
Start: 2020-08-17

## 2020-08-17 NOTE — PROGRESS NOTES
Demographics:  Language: Indonesian  Ethnicity: / / [address] of Origin: [place]    Education/Occupation:  Highest grade completed: Do not know  Occupation: Unemployed    Personal & Family History:  Personal history of diabetes? no  Family members with diabetes: no    Pregnancy History:  How many total pregnancies have you had? 4  How many children do you have? 3  Have you had a baby weighing larger than 9 lbs? no  Are you having swelling or fluid retention? no  Have you been placed on bedrest? no    Physical Activity:  Do you exercise? yes  Type of Exercise: Walking with children  Frequency of Exercise: Other most days of week    Nutrition Questionnaire: How many meals do you eat daily? 3  List times of meals: Breakfast: 9/ Lunch: 1/ Dinner: 6  How many snacks do you eat daily? 3  List times of snacks: AM Snack: 11/ PM Snack: 4/ Bedtime Snack: 8-9  What type of diet are you following at home? Regular avoiding fried food   Do you have special or ethnic dietary preferences? no  Do you have any food allergies or intolerances? no  Typical day intake:  B: 9- am oatmeal with milk  S: 11- toast with cream cheese  L: 1 - fish with salad  S: 4- piece of fruit or regular yogurt  D: 6- fish and feta cheese  S: 8-9- apple or regular yogurt    Learning preferences: How do you learn best? Lecture  How do you rate your health? Michaela Ponce  Who is your primary support person? Spouse  How do you cope with stress? Reading and cooking  Do you have any cultural or Rastafari beliefs we should be aware of? no  How do you feel the diabetes diagnosis will affect the rest of your pregnancy?  Worried about size of baby and giving birth to large baby  Do you receive WIC or food stamps? no

## 2020-08-17 NOTE — PROGRESS NOTES
Thank you for referring your patient to [facility] Diabetes Education [facility]      Kusum Ozuna is a  45 y o  female who presents today for Initial visit (class 1)  Patient is at 33w6d gestation, Estimated Date of Delivery: 20   #[id number] used  Reviewed and updated the following from patients medical record: PMH, Problem List, Allergies, and Current Medications  Diagnosis:  Medical Diagnosis/ICD 10:GDM third trimester method of control unspecified    Discussed with patient what GDM is, pathophysiology of GDM, untreated GDM and maternal fetal complications including fetal macrosomia,  hypoglycemia, polyhydramnios, increased incidence of  section,  labor, and in severe cases fetal demise and still birth   Discussed importance of blood glucose monitoring, nutrition, and medication if necessary in achieving BG goals  Discussed resumption of non-diabetic state post delivery, but reviewed increased risk of Type 2 DM later in life and ways to reduce risk by maintaining a healthy weight and eating habits  PMH/PSH:  Past Medical History:   Diagnosis Date    No known health problems      Past Surgical History:   Procedure Laterality Date    CHOLECYSTECTOMY         Labs:  No components found for: HGA1C  No results found for: EAG  Lab Results   Component Value Date    [id number] 157 (H) 2020       (8/3) 3-Hr GTT:   Fastin mg/dL      Medications:  Current Outpatient Medications on File Prior to Visit   Medication Sig Dispense Refill    Prenatal Vit-Fe Fumarate-FA (PRENATAL VITAMIN PO) Take 1 tablet by mouth daily       No current facility-administered medications on file prior to visit          Recent Ultrasound Report:   DATE:  Fetal Growth: Normal  NAIN: Normal    Anthropometrics:  Ht Readings from Last 3 Encounters:   20 5' 6" (1 676 m)   20 5' 6" (1 676 m)   20 5' 6" (1 676 m)     Wt Readings from Last 3 Encounters:   20 82 3 kg (181 lb 6 4 oz)   20 81 8 kg (180 lb 6 4 oz)   20 81 6 kg (180 lb)     Pre-gravid weight: 74 4 kg (164 lb)  Pre-gravid BMI: 26 48  Weight Change: 17 lb weight gain  Weight gain recommendations: BMI (25-29 9) 15-25 lbs    Blood Glucose Monitoring:   Glucose Meter: Contour Next EZ  Instructed on testing blood sugars: 4 x per day (Fasting, 2 hour after start of each meal)  Blood sugar reading during demo: Patient used her husbands contour next glucose meter and supplies to test blood sugar 3 times prior to appointment  Due to patient not having insurance, advised her to  contour next ez meter at [facility] office at her earliest convenience  Ordered strips and lancets and sent to her pharmacy on file  Notified patient to apply for discount card when she picks up her supplies  She did not have any questions regarding how to test      Gave instruction on site selection, skin preparation, loading strips and lancet device, meter activation, obtaining blood sample, test strip and lancet disposal and storage, and recording log book entries  Patient has good understanding of material covered and was able to test their own blood sugar in office today  Instruction for reporting blood sugar results weekly via:   Phone: [phone]   Fax: [phone]   My Chart (Message, or Glucose Flowsheet)    Goal Blood Sugar Ranges:    Fastin-90 mg/dL   1 hour after the start of each meal: 135 mg/dL or <   2 hours after start of each meal: 120 mg/dL or <    BG Lo/16: fasting- 77 mg/dL, 1 hr PP lunch- 160 mg/dL, 1 hr PP dinner- 145 mg/dL    Meal Plan (daily calorie and protein needs):  Calories: 2000 calorie (CHO:45-15-60-15-60-30) (PRO: 2/3-1-3/4-1-3/4-2)  Protein: 90 gm/day    Diet Instruction:  1  Patient was provided with a meal plan including 3 meals and 3 snacks  2  Discussed appropriate amounts of CHO, PRO, and Fat at each meal and snack     3  Reviewed CHO exchange list, and portion sizes for both CHO and PRO via food models  4  Instruction on how to read a food label  5  Provided suggested meal/snack options to increase nutrition and maintain consistent meal and snack intakes  6  Instructed on how to keep a 3-day food diary to be brought to follow- up appointment  7  Encouraged  patient to eat every 2 0-3 5 hours while awake  8  Encouraged patient to go no longer than 8-10 hours fasting overnight until first meal of the day  Physical Activity:  Discussed benefits of physical activity to optimize blood glucose control, encouraged activity at patient is physically able  Always consult a physician prior to starting an exercise program  Recommend 20-30 minutes daily  Nutrition Diagnosis Statement:  Nutrition Diagnosis: Altered nutrition related lab values (glucose)  Related to: GDM  As evidenced by: 1 hr  mg/dL, fasting glucose - 109 mg/dL    Goals:  1  Fasting BG 60-90 mg/dL  2  2 hr PP Blood Glucose <120 mg/dL (1 hr PP <135)  3  GDM Diet     Patient Stated Goal: "I will plan my meals and snacks every day"    Monitoring and Evaluation  1  Adherence to GDM diet  2  Blood Glucose Monitoring   3  Weight/ Body Composition    Learner/s Present:Learners Present: Patient   Educational Materials Provided: Diabetes in Pregnancy Booklet and Meal Plan 2000 calorie meal plan  Teaching methods used: Teaching Methods MFM: Listened to Instruction, Visualized Demonstration and Food Models  Patients Response to Education Information Provided: Voices Understanding  Readiness to Change: Preparation (Preparing to make changes)  Barriers to Learning/Change: cultural/Protestant and language    Expected Compliance: good    Date to report blood sugars: Monday, 8/24  Class 2 (date): Monday, 8/24    Begin Time: 11:00 am  End Time: 12:00 pm    It was a pleasure working with them today  Please feel free to call with any questions or concerns      Diana Roach RD, MARCELLAN  Diabetes Educator  [facility]  Diabetes in Pregnancy Program  [address], [address] [place] [address], [address] [place] [address]  Virtual Regular Visit      Assessment/Plan:    Problem List Items Addressed This Visit        Endocrine    Diet controlled gestational diabetes mellitus (GDM) in third trimester       Other    32 weeks gestation of pregnancy - Primary               Reason for visit is   Chief Complaint   Patient presents with    Virtual Regular Visit        Encounter provider Garrett Kussmaul    Provider located at [address] [place] [address] [phone]      Recent Visits  No visits were found meeting these conditions  Showing recent visits within past 7 days and meeting all other requirements     Future Appointments  No visits were found meeting these conditions  Showing future appointments within next 150 days and meeting all other requirements        The patient was identified by name and date of birth  Edwin Conklin was informed that this is a telemedicine visit and that the visit is being conducted through "[employer]  My office door was closed  No one else was in the room  She acknowledged consent and understanding of privacy and security of the video platform  The patient has agreed to participate and understands they can discontinue the visit at any time  Patient is aware this is a billable service  Subjective  Edwin Conklin is a 45 y o  female          HPI     Past Medical History:   Diagnosis Date    No known health problems        Past Surgical History:   Procedure Laterality Date    CHOLECYSTECTOMY         Current Outpatient Medications   Medication Sig Dispense Refill    Chante Microlet Lancets lancets Test blood sugar 4 times per day, fasting and 2 hours after the start of each meal  100 each 3    Contour Next Test test strip Test blood sugar 4 times per day, fasting and 2 hours after the start of each meal  100 each 3    Prenatal Vit-Fe Fumarate-FA (PRENATAL VITAMIN PO) Take 1 tablet by mouth daily       No current facility-administered medications for this visit  No Known Allergies    Review of Systems    Video Exam    There were no vitals filed for this visit  Physical Exam     I spent 60 minutes directly with the patient during this visit      [address] acknowledges that she has consented to an online visit or consultation  She understands that the online visit is based solely on information provided by her, and that, in the absence of a face-to-face physical evaluation by the physician, the diagnosis she receives is both limited and provisional in terms of accuracy and completeness  This is not intended to replace a full medical face-to-face evaluation by the physician  Mikie Leslie understands and accepts these terms

## 2020-08-17 NOTE — TELEPHONE ENCOUNTER
Ordered supplies for contour next EZ meter supplies, and patient agreeable to  meter kit at [facility] office sometime this week  She stated in class 1 education appointment this morning that she has been using her husbands contour next meter and supplies prior to office visit today but will  supplies at her earliest convenience

## 2020-08-18 NOTE — PATIENT INSTRUCTIONS
1  Continue Meal Plan consisting of 3 meals and 3 snacks daily, including protein at each  2  Try to eat every 2-3 5 hours while awake  3  Do not exceed 8-10 hours fasting overnight  4  Continue self-monitoring blood glucose: 4 x per day (Fasting, 2 hour after start of each meal)  5  Submit blood sugar values weekly via: Telephone  at [phone] or [facility] messaging or [facility] glucose flowsheet  6  If no restriction from physician, recommend up to 30 minutes walking daily  7  Report blood sugars on Monday, 8/24  8  Complete 3 day food log and send in via [facility] message as image attachment to [facility] provider prior to class 2 appointment  9  Class 2 follow up appointment scheduled for Monday, 8/24  10   contour next meter at [facility] office at earliest convenience  11   contour next testing supplies at pharmacy  Apply for discount card to provide at pharmacy

## 2020-08-20 ENCOUNTER — ROUTINE PRENATAL (OUTPATIENT)
Dept: OBGYN CLINIC | Facility: CLINIC | Age: 40
End: 2020-08-20

## 2020-08-20 VITALS
WEIGHT: 183.6 LBS | BODY MASS INDEX: 29.63 KG/M2 | DIASTOLIC BLOOD PRESSURE: 78 MMHG | HEART RATE: 96 BPM | SYSTOLIC BLOOD PRESSURE: 120 MMHG | TEMPERATURE: 98.2 F

## 2020-08-20 DIAGNOSIS — Z3A.34 34 WEEKS GESTATION OF PREGNANCY: Primary | ICD-10-CM

## 2020-08-20 DIAGNOSIS — O24.410 DIET CONTROLLED GESTATIONAL DIABETES MELLITUS (GDM) IN THIRD TRIMESTER: ICD-10-CM

## 2020-08-20 PROCEDURE — PNV: Performed by: OBSTETRICS & GYNECOLOGY

## 2020-08-20 NOTE — PROGRESS NOTES
OB/GYN prenatal visit    S: 45 y o  [id number] 34w2d here for PN visit  She has no obstetric complaints, including pelvic pain, contractions, vaginal bleeding, loss of fluid, or decreased fetal movement  Patient has been keeping record of her blood sugars at home  Reports glucose levels in the 70s before breakfast and typically <120 following meals  She states she sometimes has elevated levels 2h after breakfast  Today 2h pp 150  She has established care with diabetic counselor  Both she and her  are worried about her going into labor because they live in [place]  O:  Vitals:    20 1524   BP: 120/78   Pulse: 96   Temp: 98 2 °F (36 8 °C)     Physical Exam  Constitutional:       Appearance: She is well-developed  HENT:      Head: Normocephalic and atraumatic  Cardiovascular:      Rate and Rhythm: Normal rate and regular rhythm  Pulses: Normal pulses  Heart sounds: Normal heart sounds  Pulmonary:      Effort: Pulmonary effort is normal       Breath sounds: Normal breath sounds  Abdominal:      General: Bowel sounds are normal       Palpations: Abdomen is soft  Tenderness: There is no abdominal tenderness  Musculoskeletal:      Right lower leg: No edema  Left lower leg: No edema  Neurological:      Mental Status: She is alert and oriented to person, place, and time  Skin:     General: Skin is warm and dry  Psychiatric:         Behavior: Behavior normal    Vitals signs reviewed  Fundal height: 35cm    FHT: 153bpm via doppler    A/P:    1  A1GDM  - Continue to log blood sugars QID  - if >20% FBS >95 or 2h pp >120 need to start insulin or metformin per MFM  - Reviewed risks of GDM including increased risk of  section, increased risk of complications with  section, fetal macrosomia,  hypoglycemia and in severe cases, fetal demise  2  Postpartum contraception   - Desires sterilization   MA-31 signed at previous visit  - Following with social worker and financial counselor  3  Birth plan - hx  x3, no epidural  4   Delivery consent signed today    RTC in 2 weeks, GBS at that time    D/w Dr Ross Fowler, MD   OB/GYN PGY-1  2020  3:43 PM

## 2020-08-24 ENCOUNTER — DOCUMENTATION (OUTPATIENT)
Dept: PERINATAL CARE | Facility: CLINIC | Age: 40
End: 2020-08-24

## 2020-08-24 ENCOUNTER — TELEMEDICINE (OUTPATIENT)
Dept: PERINATAL CARE | Facility: CLINIC | Age: 40
End: 2020-08-24

## 2020-08-24 DIAGNOSIS — E66.3 OVERWEIGHT WITH BODY MASS INDEX (BMI) OF 26 TO 26.9 IN ADULT: ICD-10-CM

## 2020-08-24 DIAGNOSIS — O24.410 DIET CONTROLLED GESTATIONAL DIABETES MELLITUS (GDM) IN THIRD TRIMESTER: Primary | ICD-10-CM

## 2020-08-24 DIAGNOSIS — Z3A.34 34 WEEKS GESTATION OF PREGNANCY: ICD-10-CM

## 2020-08-24 PROCEDURE — G0108 DIAB MANAGE TRN  PER INDIV: HCPCS | Performed by: DIETITIAN, REGISTERED

## 2020-08-24 NOTE — PROGRESS NOTES
Date:  20  RE: Sandi Nest    : 1980  Estimated Date of Delivery: 20  EGA: 34w6d  OB/GYN: [facility]  Diet controlled gestational diabetes    Date Fasting Post-  breakfast Post-  lunch Post-  dinner Before bedtime Comments   20 86 152 109 133     20 84 150 109 130     20 82 148 110 127     20 87 158 109 130     20 82 148 110 127     20 81 147 110 129     20 82 148         BG was reported & discussed at Class 2 today  Current regimen:  2000 calorie GDM diet with 3 meals & 3 snacks  Reported she drinks either 16 oz of orange juice or lemonade at both breakfast & dinner  Drinks water at lunch  Self-Blood Glucose monitoring 4 times daily with a Contour Next EZ glucose meter  Her & her  share this meter  Walks 10 minuets daily; encouraged patient to walk up to 30 minutes if possible  Plan:  Continue meal plan consisting of 3 meals and 3 snacks, including protein at each  Only eating protein at both lunch & dinner  Skips protein at breakfast & all 3 snacks  Understands to add protein to these times  Understands to change to water as her beverage  Continue testing her BG      Diabetes Self Management Support Plan outside of ongoing care: Spouse/Family    Date due to report next:  Thursday, 20    Keara Brown  Diabetes Educator   Diabetes and Pregnancy Program

## 2020-08-24 NOTE — PROGRESS NOTES
Virtual Regular Visit      Assessment/Plan:    Problem List Items Addressed This Visit     None               Reason for visit is   Chief Complaint   Patient presents with    Virtual Regular Visit        Encounter provider Linda Soria    Provider located at [address] [place] [id number] [phone]      Recent Visits  Date Type Provider Dept   08/17/20 Anabella Piña   Showing recent visits within past 7 days and meeting all other requirements     Future Appointments  No visits were found meeting these conditions  Showing future appointments within next 150 days and meeting all other requirements        The patient was identified by name and date of birth  Mabel Campa was informed that this is a telemedicine visit and that the visit is being conducted through [employer]  My office door was closed  No one else was in the room  She acknowledged consent and understanding of privacy and security of the video platform  The patient has agreed to participate and understands they can discontinue the visit at any time  Patient is aware this is a billable service  Subjective  Mabel Campa is a 45 y o  female pregnant patient  HPI     Past Medical History:   Diagnosis Date    No known health problems        Past Surgical History:   Procedure Laterality Date    CHOLECYSTECTOMY         Current Outpatient Medications   Medication Sig Dispense Refill    [employer] [facility] Microlet Lancets lancets Test blood sugar 4 times per day, fasting and 2 hours after the start of each meal  100 each 3    Contour Next Test test strip Test blood sugar 4 times per day, fasting and 2 hours after the start of each meal  100 each 3    Prenatal Vit-Fe Fumarate-FA (PRENATAL VITAMIN PO) Take 1 tablet by mouth daily       No current facility-administered medications for this visit           No Known Allergies    Review of Systems    Video Exam    There were no vitals filed for this visit  Physical Exam --not performed  Time spent with the patient  VIRTUAL VISIT DISCLAIMER    Roman Basilio acknowledges that she has consented to an online visit or consultation  She understands that the online visit is based solely on information provided by her, and that, in the absence of a face-to-face physical evaluation by the physician, the diagnosis she receives is both limited and provisional in terms of accuracy and completeness  This is not intended to replace a full medical face-to-face evaluation by the physician  Roman Praful understands and accepts these terms  DATE:  20  RE: Roman Basilio    : 1980    JAKE: Estimated Date of Delivery: 20    EGA: 34w6d    Dear Ritu  At [facility]:    Thank you for referring your patient to the Diabetes and Pregnancy Program at [address] [place] [address]  The patient pregnancy is complicated with language barrier  John-Mongolian interpretor # [id number] was used via the [employer]  The patient attended Class 2 received the following education via virtual telemedcine:    Weight gain during in pregnancy  Based on the patients height of   inches, pre-pregnancy weight of 74 4 kg (164 lb) pounds (BMI-26 48), we would recommend a total weight gain of 15-25 pounds for the pregnancy   The patients current weight is 183 5  pounds, and her weight gain to date is 19 5 pounds  Based on this, we recommend a weight gain of 5 pounds for the remainder of the pregnancy   Medical Nutrition Therapy for diabetes and pregnancy  The patients 24 hour diet recall was reviewed and discussed  The patient was instructed on the following:  o Individualized meal plan   Patient is eating 3 meals & 3 snacks, but is not including protein at snacks & at breakfast  All her after breakfast & dinner BG results are elevated due to drinking 16 oz orange jucie or lemonade at those 2 meals  Drinks water at lunch & has normal BG results after lunch    o Use of food diary to maintain a meal plan    o Importance of protein as it relates to blood glucose control   Review of blood glucose log  Reinforcement of blood glucose goals and reporting guidelines   Ultrasounds every four weeks in the [address] to evaluate fetal growth   Exercise Guidelines:   o Walking up to thirty minutes daily can reduce blood glucose levels  o Monitor for greater than four contractions per hour     o The patient has been instructed not to begin physical activity if she has been instructed not to exercise by your office   Sick day guidelines and hypoglycemia with treatment   Post-partum guidelines:  o Completion of a 75 gram glucose tolerance test at 6 weeks post-partum to check for type 2 diabetes  o 20% weight loss and 30 minutes of exercise 5 times per week reduces the risk of type 2 diabetes   Breastfeeding guidelines   Report blood glucose levels to [address] weekly or as directed  o Phone: [phone]  If no response in 24 hours, call [phone]    o Fax: [phone]  o Email: [email or web address]    Diabetes Self Management Support Plan outside of ongoing care: Spouse/Family    Please contact the Diabetes and Pregnancy Program at [phone] if you have questions  Time spent with patient 11:05-12:30 PM; time spent face to face counseling greater than 50% of the appointment      Sincerely,     Selina Coffey  Diabetes Educator  Diabetes and Pregnancy Program

## 2020-09-01 ENCOUNTER — DOCUMENTATION (OUTPATIENT)
Dept: PERINATAL CARE | Facility: CLINIC | Age: 40
End: 2020-09-01

## 2020-09-01 ENCOUNTER — TELEPHONE (OUTPATIENT)
Dept: PERINATAL CARE | Facility: CLINIC | Age: 40
End: 2020-09-01

## 2020-09-01 DIAGNOSIS — Z3A.36 36 WEEKS GESTATION OF PREGNANCY: ICD-10-CM

## 2020-09-01 DIAGNOSIS — O24.410 DIET CONTROLLED GESTATIONAL DIABETES MELLITUS (GDM) IN THIRD TRIMESTER: Primary | ICD-10-CM

## 2020-09-01 NOTE — PROGRESS NOTES
Date:  20  RE: Howard Duckworth    : 1980  Estimated Date of Delivery: 20  EGA: 36w0d  OB[facility]GYN: [facility]  Diet controlled gestational diabetes    Date Fasting Post-  breakfast Post-  lunch Post-  dinner Before bedtime Comments   20 79 148 109 126     20 78 140 109 130     20 78 146 110 130     20 79 144 109 129      78 139 110 128      78 140 110 129      78 139 110 128      78 140 110 128      78 139 109 128      78 138 109 127     9/3 77 132                  Via phone in [facility] [place]  Current regimen:  2000 calorie GDM diet with 3 meals and 3 meals, skipping protein with breakfast and snacks  Drinking water only  Self-monitoring blood glucose fasting and 2 hours PP, 4 times daily with a Contour Next EZ glucose meter  Walks 15 minutes daily  Plan:  Reported glucose readings appear to be within same range, stressed importance of testing fasting and 2 hours post start of each meal and reviewed risk factors  Encouraged to eat 3 meals and 3 snacks including combination of carbohydrates, protein and fat per meal/snack  Recommended a breakfast sandwich and to add nuts, peanut butter or cheese with apple or raisons  No more than 8 to 10 hours of fasting overnight  Aim to increase walking to 30 minutes a day  If glucose readings are not within goal, will need to add insulin to regimen and currently finances are an issue  Refer to 20 US report  Diabetes Self Management Support Plan outside of ongoing care: Spouse/Family    Date due to report next:  Tuesday, 20       Lexus Diaz, MSN, CRNP, CDE, BC-ADM  [facility] Maternal Fetal Medicine  Diabetes and Pregnancy Program

## 2020-09-01 NOTE — TELEPHONE ENCOUNTER
Spoke with patient and confirmed appointment with MFM  1 support person ( must be over age of 15) may accompany patient  Will you and your support person be able to wear a mask ,without a valve , during entire appointment? yes   To minimize your exposure in our waiting area,check in and rooming questions will be done via phone  When you arrive in the parking lot please call the following inside line # prior to entering office:    [facility] [phone] line: [facility] line:  [address] line: [phone]  [place] line:  [phone]  [place] line: [phone]    Have you or your support person traveled outside the state in the last 2 weeks?no   If yes, what state did you travel to? no     Do you or your support person have:  Fever or flu- like symptoms?no  Symptoms of upper respiratory infection like runny nose, sore throat or cough?o  Do you have new headache that you have not had in the past?no  Have you experienced any new shortness of breath recently?no  Do you have any new loss of taste or smell?no  Do you have any new diarrhea, nausea or vomiting?no  Have you recently been in contact with anyone who has been sick or diagnosed with COVID-19 infection?no  Have you been recommended to quarantine because of an exposure to a confirmed positive COVID19 person?no  You and your support person will have temperature screening upon arrival   Patient verbalized understanding of all instructions   -------------------------------------------------------------        IF you are not feeling well- cough, fever, shortness of breath or any flu like symptoms, contact your primary care physician or [address] [place] [address] [place]    Any questions with these instructions please call Maternal Fetal Medicine nurse line today @ # [phone]

## 2020-09-02 ENCOUNTER — ULTRASOUND (OUTPATIENT)
Dept: PERINATAL CARE | Facility: CLINIC | Age: 40
End: 2020-09-02

## 2020-09-02 ENCOUNTER — TELEPHONE (OUTPATIENT)
Dept: OBGYN CLINIC | Facility: CLINIC | Age: 40
End: 2020-09-02

## 2020-09-02 VITALS
SYSTOLIC BLOOD PRESSURE: 108 MMHG | BODY MASS INDEX: 29.25 KG/M2 | HEIGHT: 66 IN | HEART RATE: 101 BPM | TEMPERATURE: 98.1 F | WEIGHT: 182 LBS | DIASTOLIC BLOOD PRESSURE: 72 MMHG

## 2020-09-02 DIAGNOSIS — Z3A.36 36 WEEKS GESTATION OF PREGNANCY: ICD-10-CM

## 2020-09-02 DIAGNOSIS — O32.2XX0 TRANSVERSE LIE OF FETUS, SINGLE OR UNSPECIFIED FETUS: ICD-10-CM

## 2020-09-02 DIAGNOSIS — O09.523 ELDERLY MULTIGRAVIDA, THIRD TRIMESTER: ICD-10-CM

## 2020-09-02 DIAGNOSIS — O24.410 DIET CONTROLLED GESTATIONAL DIABETES MELLITUS (GDM) IN THIRD TRIMESTER: Primary | ICD-10-CM

## 2020-09-02 PROCEDURE — 99212 OFFICE O/P EST SF 10 MIN: CPT | Performed by: OBSTETRICS & GYNECOLOGY

## 2020-09-02 PROCEDURE — 76816 OB US FOLLOW-UP PER FETUS: CPT | Performed by: OBSTETRICS & GYNECOLOGY

## 2020-09-02 NOTE — LETTER
September 2, 2020     [address]  [address] [address], [address] [place] [address] Tamiko Albarran    Patient: Lianne Belle   YOB: 1980   Date of Visit: 9/2/2020       Dear Dr Deondre Bullock:    Thank you for referring Lianne Belle to me for evaluation  Below are my notes for this consultation  If you have questions, please do not hesitate to call me  I look forward to following your patient along with you  Sincerely,        Priscilla Deleon MD        CC: No Recipients  Priscilla Deleon MD  9/2/2020 11:10 AM  Sign when Signing Visit  Please refer to the [facility] ultrasound report in Ob Procedures for additional information regarding today's visit

## 2020-09-02 NOTE — PROGRESS NOTES
Please refer to the [facility] ultrasound report in Ob Procedures for additional information regarding today's visit

## 2020-09-02 NOTE — PATIENT INSTRUCTIONS
Conteo de patadas en el embarazo   LO QUE NECESITA SABER:   El conteo de patadas mide cuánto se está moviendo benson bebé en el útero  Vazquez patada de benson bebé podría sentirse petar vazquez torcedura, vazquez vuelta, un crujido, un meneo o un golpe  Es común sentir a tu bebé patear a las 32 a 29 semanas de Martha Kobus  Es posible que sienta al bebé patear ya a las 20 semanas de Martha Kobus  INSTRUCCIONES SOBRE EL KATERIN HOSPITALARIA:   Regrese a la tod de emergencias si:   · Usted siente que benson bebé patea menos conforme pasa el día  · Usted no siente ninguna patada en un día  Pregúntele a benson Suleiman Hao vitaminas y minerales son adecuados para usted  · Usted siente un cambio en el número de patadas o movimientos de benson bebé  · Usted siente menos de 10 patadas dentro de 2 horas después de contar 2 veces  · Usted tiene preguntas o inquietudes acerca de los movimientos de benson bebé  Por qué realizar el conteo de patadas:  Los movimientos de benson bebé podrían proporcionar información de la nancy de benson bebé  Es posible que si hay problemas, benson bebé se mueva menos o nada en lo absoluto  Él podría moverse menos si no tiene suficiente espacio para desarrollarse en benson útero (vientre), o si no está obteniendo suficiente oxígeno o nutrientes de la placenta  Informe a benson médico tan pronto petar usted sienta un cambio en los movimientos de benson bebé  Los problemas que se encuentran en vazquez etapa más temprana son más fáciles de tratar  ¿Cuándo tengo que realizar el conteo de patadas? Cuándo realizar el conteo de patadas:   · Cuente las patadas en el mismo horario todos los GRASSE  · Realice el conteo de las patadas cuando benson bebé esté despierto y [place]  Benson bebé podría estar más activo en la tarde  · Realice el conteo de las patadas después de comer o barrington un refrigerio  Benson bebé podría estar más activo después de que usted coma  Espere 2 horas después de beber líquidos que contengan cafeína   La cafeína puede hacer que benson bebé esté más activo que lo normal     · Usted no debe fumar mientras está embarazada  El fumar aumenta el riesgo de problemas de nancy para usted y para benson bebé nolan el [facility]  Si usted fuma, espere 2 horas para realizar el conteo de patadas  La nicotina puede hacer que benson bebé sea más activo de lo usual   Cómo realizar el conteo de patadas:  Revise que benson bebé esté despierto antes de realizar el conteo de patadas  Usted puede despertar a benson bebé empujando benson estómago suavemente, caminando o tomando algo frío  Benson médico podría indicarle diferentes maneras de realizar el conteo  Él podría decirle que ashley lo siguiente:  · Use vazquez gráfica o un reloj para mantener un registro de la hora en que comienza y termina de contar  · Siéntese en vazquez silla o acuéstese en benson costado derecho  · Coloque avery miguel en la parte más howard de benson BJURHOLM  · Cuente hasta que llegue a las 10 patadas  Escriba cuánto tiempo le lleva contar las 10 patadas  · Podría barrington de 30 minutos a 2 horas para contar 10 patadas  No debería de barrington más de 2 horas para contar 10 patadas  · Si usted no siente las 10 patadas dentro de 2 horas, espere 1 hora y cuente de [place]  Benson bebé puede dormir hasta por 40 minutos a la vez  Acuda a avery consultas de control con benson médico según le indicaron  Anote avery preguntas para que se acuerde de hacerlas nolan avery visitas  © 2017 [address] Information is for End User's use only and may not be sold, redistributed or otherwise used for commercial purposes  All illustrations and images included in CareNotes® are the copyrighted property of [employer]  or Navjot Roshan  Esta información es sólo para uso en educación  Benson intención no es darle un consejo médico sobre enfermedades o tratamientos  Colsulte con benson [employer] farmacéutico antes de seguir cualquier régimen médico para saber si es seguro y efectivo para usted

## 2020-09-03 ENCOUNTER — ROUTINE PRENATAL (OUTPATIENT)
Dept: OBGYN CLINIC | Facility: CLINIC | Age: 40
End: 2020-09-03

## 2020-09-03 VITALS
WEIGHT: 182.4 LBS | SYSTOLIC BLOOD PRESSURE: 127 MMHG | HEART RATE: 93 BPM | DIASTOLIC BLOOD PRESSURE: 84 MMHG | TEMPERATURE: 98.2 F | BODY MASS INDEX: 29.44 KG/M2

## 2020-09-03 DIAGNOSIS — O32.2XX0 TRANSVERSE LIE OF FETUS, SINGLE OR UNSPECIFIED FETUS: ICD-10-CM

## 2020-09-03 DIAGNOSIS — Z30.09 CONSULTATION FOR FEMALE STERILIZATION: ICD-10-CM

## 2020-09-03 DIAGNOSIS — O24.410 DIET CONTROLLED GESTATIONAL DIABETES MELLITUS (GDM) IN THIRD TRIMESTER: ICD-10-CM

## 2020-09-03 DIAGNOSIS — Z3A.36 36 WEEKS GESTATION OF PREGNANCY: Primary | ICD-10-CM

## 2020-09-03 DIAGNOSIS — O09.523 ELDERLY MULTIGRAVIDA, THIRD TRIMESTER: ICD-10-CM

## 2020-09-03 LAB — EXTERNAL GROUP B STREP ANTIGEN: NEGATIVE

## 2020-09-03 PROCEDURE — PNV: Performed by: OBSTETRICS & GYNECOLOGY

## 2020-09-03 PROCEDURE — 87081 CULTURE SCREEN ONLY: CPT | Performed by: OBSTETRICS & GYNECOLOGY

## 2020-09-03 NOTE — PROGRESS NOTES
[address] [facility]  [id number]  1980        A/P:  G4 Problems (from 03/24/20 to present)     Problem Noted Resolved    Diet controlled gestational diabetes mellitus (GDM) in third trimester      - if >20% FBS >95 or 2h pp >120 need to start insulin or metformin per MFM     8/5/2020 by Roxanna Ramesh MD No    Elderly multigravida, third trimester 6/3/2020 by Saravanan Coon MD No    36 weeks gestation of pregnancy    - MA31 signed and active  - discussed induction of labor, patient still thinking about what she would like to do  - will bring up again next visit 6/3/2020 by RAFAEL Mendoza No            S: 45 y o  [id number] 36w2d here for PN visit  She denies contractions  She denies leakage of fluid and vaginal bleeding  She endorses good fetal movement  Her pregnancy is complicated by diet controlled diabetes  She reports fasting glucose levels in the 70s and usually <120s following meals  She has established care with diabetic counselor  O:  Vitals:    09/03/20 1529   BP: 127/84   Pulse: 93   Temp: 98 2 °F (36 8 °C)     Physical Exam  Constitutional:       Appearance: Normal appearance  Abdominal:      Palpations: Mass: gravid uterus  Musculoskeletal:         General: No swelling  Left lower leg: No edema  Skin:     General: Skin is warm and dry  Neurological:      Mental Status: She is alert and oriented to person, place, and time     Psychiatric:         Mood and Affect: Mood normal          Behavior: Behavior normal          Fetal Heart Rate: 155; Fundal Height (cm): 35 cm      Maynor Szymanski DO  OB/GYN PGY-1  9/3/2020  5:30 PM

## 2020-09-06 LAB — GP B STREP GENITAL QL CULT: NORMAL

## 2020-09-08 ENCOUNTER — TELEPHONE (OUTPATIENT)
Dept: OBGYN CLINIC | Facility: CLINIC | Age: 40
End: 2020-09-08

## 2020-09-09 ENCOUNTER — ROUTINE PRENATAL (OUTPATIENT)
Dept: OBGYN CLINIC | Facility: CLINIC | Age: 40
End: 2020-09-09

## 2020-09-09 ENCOUNTER — DOCUMENTATION (OUTPATIENT)
Dept: PERINATAL CARE | Facility: CLINIC | Age: 40
End: 2020-09-09

## 2020-09-09 VITALS
SYSTOLIC BLOOD PRESSURE: 125 MMHG | HEART RATE: 93 BPM | DIASTOLIC BLOOD PRESSURE: 82 MMHG | WEIGHT: 183.4 LBS | HEIGHT: 66 IN | BODY MASS INDEX: 29.47 KG/M2 | TEMPERATURE: 98.2 F

## 2020-09-09 DIAGNOSIS — O24.410 DIET CONTROLLED GESTATIONAL DIABETES MELLITUS (GDM) IN THIRD TRIMESTER: ICD-10-CM

## 2020-09-09 DIAGNOSIS — O32.2XX0 TRANSVERSE LIE OF FETUS, SINGLE OR UNSPECIFIED FETUS: ICD-10-CM

## 2020-09-09 DIAGNOSIS — O24.414 INSULIN CONTROLLED GESTATIONAL DIABETES MELLITUS (GDM) IN THIRD TRIMESTER: ICD-10-CM

## 2020-09-09 DIAGNOSIS — Z3A.37 37 WEEKS GESTATION OF PREGNANCY: ICD-10-CM

## 2020-09-09 DIAGNOSIS — O09.523 ELDERLY MULTIGRAVIDA, THIRD TRIMESTER: ICD-10-CM

## 2020-09-09 DIAGNOSIS — Z3A.37 37 WEEKS GESTATION OF PREGNANCY: Primary | ICD-10-CM

## 2020-09-09 DIAGNOSIS — O99.810 HYPERGLYCEMIA IN PREGNANCY: Primary | ICD-10-CM

## 2020-09-09 LAB
SL AMB  POCT GLUCOSE, UA: NEGATIVE
SL AMB POCT URINE PROTEIN: NEGATIVE

## 2020-09-09 PROCEDURE — 99213 OFFICE O/P EST LOW 20 MIN: CPT | Performed by: OBSTETRICS & GYNECOLOGY

## 2020-09-09 PROCEDURE — 81002 URINALYSIS NONAUTO W/O SCOPE: CPT | Performed by: OBSTETRICS & GYNECOLOGY

## 2020-09-09 RX ORDER — NAPROXEN SODIUM 220 MG
TABLET ORAL
Qty: 30 EACH | Refills: 0 | Status: SHIPPED | OUTPATIENT
Start: 2020-09-09 | End: 2020-09-27 | Stop reason: HOSPADM

## 2020-09-09 NOTE — PROGRESS NOTES
Date:  20  RE: Edwin Conklin    : 1980  Estimated Date of Delivery: 20  EGA: 37w1d  OB/GYN: [facility]  Diet controlled gestational diabetes    Date Fasting Post-  breakfast Post-  lunch Post-  dinner Before bedtime Comments   9/3   110 124     9/4 78 127 110 124     /5 77 126 109 124     /6 77 127 110 124     /7 76 127 110 124     8 76 127 109                          Via phone in [facility] [place]  Current regimen:  2000 calorie GDM diet with 3 meals and 3 meals, skipping protein with breakfast and snacks  Drinking water only  Self-monitoring blood glucose fasting and 2 hours PP, 4 times daily with a Contour Next EZ glucose meter  Walks 15 minutes daily  Plan:  Due to 2 hours PP>120, start NPH 16 units at 9 AM daily, patient to  supplies today and have virtual visit in AM for education with returned demonstration of insulin injection  Appointment will be 9/10/20 for 830 to 9 AM    Reported glucose readings appear to be within same range and patient reports following diet as recommended  Encouraged to continue eating 3 meals and 3 snacks including combination of carbohydrates, protein and fat per meal/snack  No more than 8 to 10 hours of fasting overnight  Refer to 20 US report  Diabetes Self Management Support Plan outside of ongoing care: Spouse/Family    Date due to report next:  Monday, 20       Karen Diego, MSN, CRNP, CDE, BC-ADM  [facility]  Diabetes and Pregnancy Program

## 2020-09-09 NOTE — PROGRESS NOTES
[address] [facility]  [id number]  1980        A/P:  G4 Problems (from 03/24/20 to present)     Problem Noted Resolved    Hyperglycemia in pregnancy 9/9/2020 by RAFAEL Riley No    Insulin controlled gestational diabetes mellitus (GDM) in third trimester    - Due to 2 hours PP>120, start NPH 16 units at 9 AM daily, patient to  supplies today and have virtual visit in AM for education with returned demonstration of insulin injection  Appointment will be 9/10/20 for 830 to 9 AM    Reported glucose readings appear to be within same range and patient reports following diet as recommended  Encouraged to continue eating 3 meals and 3 snacks including combination of carbohydrates, protein and fat per meal/snack  No more than 8 to 10 hours of fasting overnight        8/5/2020 by Shereen Galarza MD No    Elderly multigravida, third trimester 6/3/2020 by Lelo Diaz MD No    37 weeks gestation of pregnancy 6/3/2020 by RAFAEL Mcarthur No    Overview Signed 9/9/2020  4:52 PM by Iban Lyons DO     No contractions, vaginal bleeding, ROM, feeling good fetal movement  Signs, sx of labor reviewed  Active MA31                   S: 45 y o  [id number] 37w1d here for PN visit  She denies contractions  She denies leakage of fluid and vaginal bleeding  She endorses good fetal movement  Her pregnancy is complicated by [id number], she saw diabetes educators today and will start insulin tomorrow  She has an oblique lie of her fetus, the head is currently off to the L side  O:  Vitals:    09/09/20 1616   BP: 125/82   Pulse: 93   Temp: 98 2 °F (36 8 °C)     Physical Exam  Abdominal:      Palpations: Mass: gravid uterus  Tenderness: There is no abdominal tenderness           Fundal height: Fetal Heart Rate: 145  FHT: Fundal Height (cm): 38 cm      Iban Lyons DO  OB/GYN PGY-1  9/9/2020  5:03 PM

## 2020-09-10 ENCOUNTER — TELEMEDICINE (OUTPATIENT)
Dept: PERINATAL CARE | Facility: CLINIC | Age: 40
End: 2020-09-10

## 2020-09-10 VITALS — BODY MASS INDEX: 29.41 KG/M2 | HEIGHT: 66 IN | WEIGHT: 183 LBS

## 2020-09-10 DIAGNOSIS — Z3A.37 37 WEEKS GESTATION OF PREGNANCY: ICD-10-CM

## 2020-09-10 DIAGNOSIS — O09.523 ELDERLY MULTIGRAVIDA, THIRD TRIMESTER: ICD-10-CM

## 2020-09-10 DIAGNOSIS — O24.414 INSULIN CONTROLLED GESTATIONAL DIABETES MELLITUS (GDM) IN THIRD TRIMESTER: ICD-10-CM

## 2020-09-10 DIAGNOSIS — O99.810 HYPERGLYCEMIA IN PREGNANCY: Primary | ICD-10-CM

## 2020-09-10 DIAGNOSIS — Z71.89 ENCOUNTER FOR INJECTION EDUCATION: ICD-10-CM

## 2020-09-10 PROCEDURE — 99213 OFFICE O/P EST LOW 20 MIN: CPT | Performed by: NURSE PRACTITIONER

## 2020-09-10 NOTE — PROGRESS NOTES
Virtual Regular Visit      Assessment/Plan:    Problem List Items Addressed This Visit        Endocrine    Insulin controlled gestational diabetes mellitus (GDM) in third trimester     -Due to 2 hours PP>120 and financial issues, start NPH 16 units at 8 or 9 AM daily    -Continue GDM diet and SMBG  -Report on Monday, 20 or sooner if needed   -Always have glucose available for hypoglycemia, use 15 by 15 rule  -Continue follow up with OB and MFM as recommended  -NST twice a week and NAIN weekly  -Keep fetal growth ultrasound as scheduled for 20  No results found for: HGBA1C         Hyperglycemia in pregnancy - Primary       Other    Elderly multigravida, third trimester    37 weeks gestation of pregnancy    Encounter for injection education        -Insulin vial/syringe education provided with returned demonstration, patient prepared insulin injection without difficulty and  injected insulin without difficulty  Insulin pen education with returned demonstration without difficulty  · Insulin administration times, insulin action  · Hypoglycemia signs, symptoms and treatment  · Increase in maternal-fetal surveillance with insulin initiation  · Side effects of hyperglycemia in pregnancy including macrosomia,  hypoglycemia, polyhydramnios, pre-term labor and stillbirth      -Due to 2 hours PP>120 and financial issues, start NPH 16 units at 8 or 9 AM daily    -Continue GDM diet and SMBG  -Report on Monday, 20 or sooner if needed   -Always have glucose available for hypoglycemia, use 15 by 15 rule  -Continue follow up with OB and MFM as recommended  -NST twice a week and NAIN weekly  -Keep fetal growth ultrasound as scheduled for 20    -Complete fetal kick counts  -Stay in close contact with diabetes education team          Reason for visit is GDM and insulin vial/syringe education     Chief Complaint   Patient presents with    Virtual Regular Visit    Gestational Diabetes    Patient Education        Encounter provider Wade Maldonado [place]    Provider located at [address] [place] [id number] [phone]      Recent Visits  No visits were found meeting these conditions  Showing recent visits within past 7 days and meeting all other requirements     Today's Visits  Date Type Provider Dept   09/10/20 Telemedicine Wade Maldonado, [facility] today's visits and meeting all other requirements     Future Appointments  No visits were found meeting these conditions  Showing future appointments within next 150 days and meeting all other requirements        The patient was identified by name and date of birth  Geetha Vega was informed that this is a telemedicine visit and that the visit is being conducted through [employer]  My office door was closed  No one else was in the room  She acknowledged consent and understanding of privacy and security of the video platform  The patient has agreed to participate and understands they can discontinue the visit at any time  Patient is aware this is a billable service  Subjective  Geetha Vega is a 45 y o  female , 40 2/7 weeks gestation, GDM   Follow up GDM and insulin vial/syringe education  Testing 4 times a day, eating 3 meals and 3 snacks  Fasting glucose within normal, having post breakfast and post dinner glucose readings above 120  Due to finances, will use NPH in am to assist with improving glucose readings and will only need to inject once a day vs twice a day          Past Medical History:   Diagnosis Date    No known health problems        Past Surgical History:   Procedure Laterality Date    CHOLECYSTECTOMY         Current Outpatient Medications   Medication Sig Dispense Refill    Chante Microlet Lancets lancets Test blood sugar 4 times per day, fasting and 2 hours after the start of each meal  100 each 3  Contour Next Test test strip Test blood sugar 4 times per day, fasting and 2 hours after the start of each meal  100 each 3    insulin NPH (HumuLIN N,NovoLIN N) 100 Units/mL subcutaneous injection Inject 16 Units under the skin daily Relion NPH At 9 AM daily  To be titrated  10 mL 0    Insulin Syringe-Needle U-100 (INSULIN SYRINGE  5CC/30GX5/16") 30G X 5/16" 0 5 ML MISC Use 1 a day or as directed  30 each 0    Prenatal Vit-Fe Fumarate-FA (PRENATAL VITAMIN PO) Take 1 tablet by mouth daily       No current facility-administered medications for this visit  No Known Allergies    Review of Systems    Video Exam    Vitals:    09/10/20 0859   Weight: 83 kg (183 lb)   Height: 5' 6" (1 676 m)       Physical Exam  Constitutional:       Appearance: She is obese  HENT:      Head: Normocephalic  Nose: Nose normal    Eyes:      Conjunctiva/sclera: Conjunctivae normal    Neck:      Musculoskeletal: Normal range of motion  Comments: Acanthosis nigricans neck area noted  Pulmonary:      Effort: Pulmonary effort is normal    Abdominal:      Comments: Abdominal striae noted  Neurological:      Mental Status: She is alert and oriented to person, place, and time  Psychiatric:         Mood and Affect: Mood normal          Behavior: Behavior normal          Thought Content: Thought content normal          Judgment: Judgment normal           I spent 15 minutes with patient today in which greater than 50% of the time was spent in counseling/coordination of care regarding 10 minutes  VIRTUAL VISIT DISCLAIMER    Suzi Little acknowledges that she has consented to an online visit or consultation  She understands that the online visit is based solely on information provided by her, and that, in the absence of a face-to-face physical evaluation by the physician, the diagnosis she receives is both limited and provisional in terms of accuracy and completeness   This is not intended to replace a full medical face-to-face evaluation by the physician  Tapan Coy understands and accepts these terms

## 2020-09-10 NOTE — ASSESSMENT & PLAN NOTE
-Due to 2 hours PP>120 and financial issues, start NPH 16 units at 8 or 9 AM daily    -Continue GDM diet and SMBG  -Report on Monday, 9/14/20 or sooner if needed   -Always have glucose available for hypoglycemia, use 15 by 15 rule  -Continue follow up with OB and MFM as recommended  -NST twice a week and NAIN weekly  -Keep fetal growth ultrasound as scheduled for 9/23/20     No results found for: HGBA1C

## 2020-09-10 NOTE — PATIENT INSTRUCTIONS
-Due to 2 hours PP>120 and financial issues, start NPH 16 units at 8 or 9 AM daily    -Continue GDM diet and SMBG  -Report on Monday, 9/14/20 or sooner if needed   -Always have glucose available for hypoglycemia, use 15 by 15 rule  -Continue follow up with OB and MFM as recommended  -NST twice a week and NAIN weekly  -Keep fetal growth ultrasound as scheduled for 9/23/20    -Complete fetal kick counts     -Stay in close contact with diabetes education team

## 2020-09-11 ENCOUNTER — TELEPHONE (OUTPATIENT)
Dept: PERINATAL CARE | Facility: CLINIC | Age: 40
End: 2020-09-11

## 2020-09-14 ENCOUNTER — ULTRASOUND (OUTPATIENT)
Dept: PERINATAL CARE | Facility: CLINIC | Age: 40
End: 2020-09-14

## 2020-09-14 VITALS
SYSTOLIC BLOOD PRESSURE: 122 MMHG | WEIGHT: 184 LBS | HEART RATE: 91 BPM | BODY MASS INDEX: 29.57 KG/M2 | TEMPERATURE: 98.1 F | HEIGHT: 66 IN | DIASTOLIC BLOOD PRESSURE: 76 MMHG

## 2020-09-14 DIAGNOSIS — O24.414 INSULIN CONTROLLED GESTATIONAL DIABETES MELLITUS (GDM) IN THIRD TRIMESTER: ICD-10-CM

## 2020-09-14 DIAGNOSIS — Z3A.37 37 WEEKS GESTATION OF PREGNANCY: Primary | ICD-10-CM

## 2020-09-14 PROCEDURE — 59025 FETAL NON-STRESS TEST: CPT | Performed by: OBSTETRICS & GYNECOLOGY

## 2020-09-14 PROCEDURE — 76815 OB US LIMITED FETUS(S): CPT | Performed by: OBSTETRICS & GYNECOLOGY

## 2020-09-14 NOTE — PATIENT INSTRUCTIONS
Conteo de patadas en el embarazo   CUIDADO AMBULATORIO:   El conteo de patadas  mide cuánto se está moviendo benson bebé en el útero  Vazquez patada de benson bebé podría sentirse petar vazquez torcedura, vazquez vuelta, un crujido, un meneo o un golpe  Es común sentir a tu bebé patear a las 32 a 29 semanas de Dorcus Pontiff  Es posible que sienta al bebé patear ya a las 20 semanas de Dorcus Pontiff  Busque atención médica de inmediato si:   · Usted siente que benson bebé patea menos conforme pasa el día  · Usted no siente ninguna patada en un día  Pregúntele a benson Leamon Handsome vitaminas y minerales son adecuados para usted  · Usted siente un cambio en el número de patadas o movimientos de benson bebé  · Usted siente menos de 10 patadas dentro de 2 horas después de contar 2 veces  · Usted tiene preguntas o inquietudes acerca de los movimientos de benson bebé  Por qué realizar el conteo de patadas:  Los movimientos de benson bebé podrían proporcionar información de la nancy de benson bebé  Es posible que si hay problemas, benson bebé se mueva menos o nada en lo absoluto  Él podría moverse menos si no tiene suficiente espacio para desarrollarse en benson útero (vientre), o si no está obteniendo suficiente oxígeno o nutrientes de la placenta  Informe a benson médico tan pronto petar usted sienta un cambio en los movimientos de benson bebé  Los problemas que se encuentran en vazquez etapa más temprana son más fáciles de tratar  ¿Cuándo tengo que realizar el conteo de patadas? Cuándo realizar el conteo de patadas:   · Cuente las patadas en el mismo horario todos los GRASSE  · Realice el conteo de las patadas cuando benson bebé esté despierto y [place]  Benson bebé podría estar más activo en la tarde  · Realice el conteo de las patadas después de comer o barrington un refrigerio  Benson bebé podría estar más activo después de que usted coma  Espere 2 horas después de beber líquidos que contengan cafeína   La cafeína puede hacer que benson bebé esté más activo que lo normal     · Usted no debe fumar mientras está embarazada  El fumar aumenta el riesgo de problemas de nancy para usted y para blue bebé nolan el [place] [facility]  Si usted fuma, espere 2 horas para realizar el conteo de patadas  La nicotina puede hacer que blue bebé sea más activo de lo usual   Cómo realizar el conteo de patadas:  Revise que blue bebé esté despierto antes de realizar el conteo de patadas  Usted puede despertar a blue bebé empujando blue estómago suavemente, caminando o tomando algo frío  Blue médico podría indicarle diferentes maneras de realizar el conteo  Él podría decirle que ashley lo siguiente:  · Use vazquez gráfica o un reloj para mantener un registro de la hora en que comienza y termina de contar  · Siéntese en vazquez silla o acuéstese en blue costado derecho  · Coloque avery miguel en la parte más howard de blue BJURHOLM  · Cuente hasta que llegue a las 10 patadas  Escriba cuánto tiempo le lleva contar las 10 patadas  · Podría barrington de 30 minutos a 2 horas para contar 10 patadas  No debería de barrington más de 2 horas para contar 10 patadas  · Si usted no siente las 10 patadas dentro de 2 horas, espere 1 hora y cuente de [place]  Blue bebé puede dormir hasta por 40 minutos a la vez  Acuda a avery consultas de control con blue médico según le indicaron  Anote avery preguntas para que se acuerde de hacerlas nolan avery visitas  © 2017 [address] Information is for End User's use only and may not be sold, redistributed or otherwise used for commercial purposes  All illustrations and images included in CareNotes® are the copyrighted property of A D A M , Inc  or [employer]las  Esta información es sólo para uso en educación  Blue intención no es darle un consejo médico sobre enfermedades o tratamientos  Colsulte con blue Charna Heckler farmacéutico antes de seguir cualquier régimen médico para saber si es seguro y efectivo para usted      Examen de embarazo sin estrés   LO QUE NECESITA SABER:   ¿Qué necesito saber sobre un examen sin estrés? Un examen sin estrés mide el ritmo cardíaco y los movimientos de benson bebé  Sin estrés quiere decir que nolan el examen no se pondrá ningún estrés o tensión al bebé  ¿Cómo me preparo para el examen sin estrés? Benson médico hablará con usted sobre cómo prepararse para jluis examen  Le puede indicar que consuma alimentos y abundantes líquidos antes de benson examen  Si usted fuma, le puede solicitar que no fume por 2 horas antes del examen  También le indicará que medicamentos debe barrington y cuáles no debe barrington el día del examen  ¿Qué sucederá nolan el examen sin estrés? Le podrían indicar que para el examen se acueste boca arriba en la quique  Alrededor de benson abdomen [facility] [address] cinturones con sensores  El ritmo cardíaco de benson bebé será registrado en vazquez Twylla Grumet  En martha que no haya movimiento de benson bebé, podría ser que esté dormido  Benson médico podría realizar unos sonidos cerca a benson abdomen para tratar de despertar al bebé  El examen por lo general se demora 20 minutos, cheng podría durar más si es necesario despertar a benson bebé  ¿Qué necesito saber [place] Lorena del examen? Se espera que benson bebé se mueva por lo menos dos veces nolan [employer]  Se espera que el ritmo cardíaco de benson bebé suba por cierto número de latidos por minuto nolan el movimiento  Si benson bebé no se mueve petar es lo esperado, es posible que sea necesario repetir el examen o usted puede necesitar que le ordenen otros exámenes  ACUERDOS SOBRE BENSON CUIDADO:   Usted tiene el derecho de ayudar a planear benson cuidado  Aprenda todo lo que pueda sobre benson condición y petar darle tratamiento  Discuta avery opciones de tratamiento con avery médicos para decidir el cuidado que usted desea recibir  Usted siempre tiene el derecho de rechazar el tratamiento  Esta información es sólo para uso en educación  Benson intención no es darle un consejo médico sobre enfermedades o tratamientos   Colsulte con Lucie Segura (R), enfermera o farmacéutico antes de seguir cualquier régimen médico para saber si es seguro y efectivo para usted  © 2017 [address] Information is for End User's use only and may not be sold, redistributed or otherwise used for commercial purposes  All illustrations and images included in CareNotes® are the copyrighted property of A D A M , Inc  or [employer] Islas

## 2020-09-14 NOTE — LETTER
NST sleeve cover sheet    Patient name: Alexa Rashid  : 1980  MRN: [id number]    JAKE: Estimated Date of Delivery: 20    Obstetrician: ___Louis____________________________    Reason(s) for testing:  __________________GDM________________________      Testing frequency:    _x__2x/wk  ___ 1x/wk  ___ Dopplers  ___ BPP?       Last growth scan: __________________________________________

## 2020-09-14 NOTE — PROGRESS NOTES
Please refer to the [facility] ultrasound report in Ob Procedures for additional information regarding today's visit

## 2020-09-15 ENCOUNTER — TELEPHONE (OUTPATIENT)
Dept: OBGYN CLINIC | Facility: CLINIC | Age: 40
End: 2020-09-15

## 2020-09-15 ENCOUNTER — TELEPHONE (OUTPATIENT)
Dept: PERINATAL CARE | Facility: CLINIC | Age: 40
End: 2020-09-15

## 2020-09-15 NOTE — TELEPHONE ENCOUNTER
Spoke with patient and confirmed appointment with MFM  1 support person ( must be over age of 15) may accompany patient  Will you and your support person be able to wear a mask ,without a valve , during entire appointment? no   To minimize your exposure in our waiting area,check in and rooming questions will be done via phone  When you arrive in the parking lot please call the following inside line # prior to entering office:    [facility] [phone] line: 280 [facility] line:  [phone] line: [phone]  [place] line:  [phone]  [place] line: [phone]    Have you or your support person traveled outside the state in the last 2 weeks?no   If yes, what state did you travel to? no     Do you or your support person have:  Fever or flu- like symptoms?no  Symptoms of upper respiratory infection like runny nose, sornoe throat or cough? no  Do you have new headache that you have not had in the past?no  Have you experienced any new shortness of breath recently?no  Do you have any new loss of taste or smell?no  Do you have any new diarrhea, nausea or vomiting?no  Have you recently been in contact with anyone who has been sick or diagnosed with COVID-19 infection?no  Have you been recommended to quarantine because of an exposure to a confirmed positive COVID19 person?no  You and your support person will have temperature screening upon arrival   Patient verbalized understanding of all instructions  IF you are not feeling well- cough, fever, shortness of breath or any flu like symptoms, contact your primary care physician or [address] Liseth Mahoney    Any questions with these instructions please call Maternal Fetal Medicine nurse line today @ # [phone]

## 2020-09-16 ENCOUNTER — ROUTINE PRENATAL (OUTPATIENT)
Dept: PERINATAL CARE | Facility: CLINIC | Age: 40
End: 2020-09-16

## 2020-09-16 ENCOUNTER — ROUTINE PRENATAL (OUTPATIENT)
Dept: OBGYN CLINIC | Facility: CLINIC | Age: 40
End: 2020-09-16

## 2020-09-16 VITALS
WEIGHT: 184.6 LBS | SYSTOLIC BLOOD PRESSURE: 109 MMHG | TEMPERATURE: 97.9 F | HEIGHT: 66 IN | BODY MASS INDEX: 29.67 KG/M2 | HEART RATE: 83 BPM | DIASTOLIC BLOOD PRESSURE: 69 MMHG

## 2020-09-16 VITALS
SYSTOLIC BLOOD PRESSURE: 117 MMHG | BODY MASS INDEX: 29.92 KG/M2 | HEART RATE: 92 BPM | WEIGHT: 185.4 LBS | TEMPERATURE: 98.2 F | DIASTOLIC BLOOD PRESSURE: 77 MMHG

## 2020-09-16 DIAGNOSIS — Z3A.38 38 WEEKS GESTATION OF PREGNANCY: Primary | ICD-10-CM

## 2020-09-16 DIAGNOSIS — O24.414 INSULIN CONTROLLED GESTATIONAL DIABETES MELLITUS (GDM) IN THIRD TRIMESTER: ICD-10-CM

## 2020-09-16 DIAGNOSIS — O24.414 INSULIN CONTROLLED GESTATIONAL DIABETES MELLITUS (GDM) IN THIRD TRIMESTER: Primary | ICD-10-CM

## 2020-09-16 DIAGNOSIS — Z30.09 CONSULTATION FOR FEMALE STERILIZATION: ICD-10-CM

## 2020-09-16 DIAGNOSIS — Z3A.38 38 WEEKS GESTATION OF PREGNANCY: ICD-10-CM

## 2020-09-16 DIAGNOSIS — O09.523 ELDERLY MULTIGRAVIDA, THIRD TRIMESTER: ICD-10-CM

## 2020-09-16 PROCEDURE — 59025 FETAL NON-STRESS TEST: CPT | Performed by: OBSTETRICS & GYNECOLOGY

## 2020-09-16 PROCEDURE — PNV: Performed by: OBSTETRICS & GYNECOLOGY

## 2020-09-16 NOTE — PROGRESS NOTES
[address] [facility]  [id number]  1980        A/P:  G4 Problems (from 20 to present)     Problem Noted Resolved    Encounter for injection education 9/10/2020 by RAFAEL Moore No    Hyperglycemia in pregnancy 2020 by RAFAEL Moore No    Insulin controlled gestational diabetes mellitus (GDM) in third trimester 2020 by Lupillo Marie MD No    Elderly multigravida, third trimester 6/3/2020 by Allen Crane MD No    38 weeks gestation of pregnancy 6/3/2020 by RAFAEL Zuniga No    Overview Addendum 2020  5:51 PM by Renetta Khalil DO     38 wks: No contractions, vaginal bleeding, ROM, feeling good fetal movement  Went over induction today, patient would like to wait and see if she goes into labor on her own but will sign the consent form the next week if she does not  Recommended to deliver at 39 0 to 39 6 by M for A2GDM  2/thick/high                   S: 45 y o  [id number] 38w1d here for PN visit  She endorses occasional contractions  She denies leakage of fluid and vaginal bleeding  She endorses good fetal movement  Her pregnancy is complicated by [id number]  Due to 2 hours PP>120 and financial issues, start NPH 16 units at 8 or 9 AM daily  She was seen on 20 in the  center for an NST/BPP/NAIN  NST was reactive, BPP was 10/10, NAIN Total = 20 2 cm  MFM recommended delivery between 39/0 and 39/6  Induction discussed today  She has an MA31 signed 20 with Dr Simon Needs   Will bridge with depo    O:  Vitals:    20 1637   BP: 117/77   Pulse: 92   Temp: 98 2 °F (36 8 °C)     Physical Exam    Fundal height: Fetal Heart Rate: 150  FHT: Fundal Height (cm): 37 cm      Renetta Khalil DO  OB/GYN PGY-1  2020  5:51 PM

## 2020-09-16 NOTE — PROGRESS NOTES
[address] [facility]  [id number]  1980        A/P:  G4 Problems (from 20 to present)     Problem Noted Resolved    Encounter for injection education 9/10/2020 by RAFAEL Love No    Hyperglycemia in pregnancy 2020 by RAFAEL Love No    Insulin controlled gestational diabetes mellitus (GDM) in third trimester 2020 by Marielos Man MD No    Elderly multigravida, third trimester 6/3/2020 by Monalisa Yeager MD No    37 weeks gestation of pregnancy 6/3/2020 by RAFAEL Del Angel No    Overview Signed 2020  4:52 PM by Bairon Doan DO     No contractions, vaginal bleeding, ROM, feeling good fetal movement                   S: 45 y o  [id number] 38w1d here for PN visit  She *** contractions  She *** leakage of fluid and vaginal bleeding  She *** good fetal movement  Her pregnancy is complicated by P8QMV  Due to 2 hours PP>120 and financial issues, start NPH 16 units at 8 or 9 AM daily  She was seen on 20 in the  center for an NST/BPP/NAIN  NST was reactive, BPP was 10/10, NAIN Total = 20 2 cm  MFM recommended delivery between 39/0 and 39/6  Induction set up today for ***  She has an MA31 signed 20 with Dr Amrita Garsia  Will bridge with ***    O:  There were no vitals filed for this visit    Physical Exam    Fundal height:    FHT:        Bairon Doan DO  OB/GYN PGY-1  2020  9:07 AM

## 2020-09-18 ENCOUNTER — TELEPHONE (OUTPATIENT)
Dept: PERINATAL CARE | Facility: CLINIC | Age: 40
End: 2020-09-18

## 2020-09-18 NOTE — TELEPHONE ENCOUNTER
Spoke with patient and confirmed appointment with MFM  1 support person ( must be over age of 15) may accompany patient  Will you and your support person be able to wear a mask ,without a valve , during entire appointment? yes   To minimize your exposure in our waiting area,check in and rooming questions will be done via phone  When you arrive in the parking lot please call the following inside line # prior to entering office:    [place] [phone] line: 280 [facility] line:  [phone] line: [phone]  [place] line:  [phone]  [place] line: [phone]    Have you or your support person traveled outside the state in the last 2 weeks?no   If yes, what state did you travel to? no     Do you or your support person have:  Fever or flu- like symptoms?no  Symptoms of upper respiratory infection like runny nose, sore throat or cough? no  Do you have new headache that you have not had in the past?no  Have you experienced any new shortness of breath recently?no  Do you have any new loss of taste or smell?no  Do you have any new diarrhea, nausea or vomiting?no  Have you recently been in contact with anyone who has been sick or diagnosed with COVID-19 infection?no  Have you been recommended to quarantine because of an exposure to a confirmed positive COVID19 person?no  You and your support person will have temperature screening upon arrival   Patient verbalized understanding of all instructions

## 2020-09-21 ENCOUNTER — ROUTINE PRENATAL (OUTPATIENT)
Dept: PERINATAL CARE | Facility: CLINIC | Age: 40
End: 2020-09-21

## 2020-09-21 ENCOUNTER — TELEPHONE (OUTPATIENT)
Dept: PERINATAL CARE | Facility: CLINIC | Age: 40
End: 2020-09-21

## 2020-09-21 VITALS
DIASTOLIC BLOOD PRESSURE: 67 MMHG | WEIGHT: 186.6 LBS | BODY MASS INDEX: 29.99 KG/M2 | TEMPERATURE: 98 F | SYSTOLIC BLOOD PRESSURE: 106 MMHG | HEART RATE: 87 BPM | HEIGHT: 66 IN

## 2020-09-21 DIAGNOSIS — Z3A.38 38 WEEKS GESTATION OF PREGNANCY: Primary | ICD-10-CM

## 2020-09-21 DIAGNOSIS — O24.414 INSULIN CONTROLLED GESTATIONAL DIABETES MELLITUS (GDM) IN THIRD TRIMESTER: ICD-10-CM

## 2020-09-21 PROBLEM — O36.60X0 EXCESSIVE FETAL GROWTH AFFECTING MANAGEMENT OF MOTHER: Status: ACTIVE | Noted: 2020-09-21

## 2020-09-21 PROBLEM — O32.2XX0 TRANSVERSE LIE OF FETUS: Status: RESOLVED | Noted: 2020-09-02 | Resolved: 2020-09-21

## 2020-09-21 PROCEDURE — 59025 FETAL NON-STRESS TEST: CPT | Performed by: OBSTETRICS & GYNECOLOGY

## 2020-09-21 NOTE — TELEPHONE ENCOUNTER
----- Message from Allie Holcomb sent at 9/18/2020 11:18 AM EDT -----  Gretchen Batista,   This patient was last seen 9/10 by Nahed Schultz who started her on insulin  She did not since report her blood sugars  She is Korean speaking  Are you able to call her to remind her to report blood sugars and maybe give her Jules direct line to report since [address]  speaking, [facility]  [facility]

## 2020-09-21 NOTE — PROGRESS NOTES
Greg Delgado: Ms Kavin Norton was seen today at 38w6d for NST (found under the pregnancy episode) which I reviewed the RN assessment and agree  Delivery recommended by end of 39th week for GDMA2      Please don't hesitate to contact our office with any concerns or questions   -Alexei Escobar MD

## 2020-09-21 NOTE — LETTER
September 21, 2020     Jordana Shelby, [address] [facility] [address] [facility]    Patient: Cheko Laura   YOB: 1980   Date of Visit: 9/21/2020       Dear Dr Marcelino Alarcon: Thank you for referring Cheko Laura to me for evaluation  Below are my notes for this consultation  If you have questions, please do not hesitate to call me  I look forward to following your patient along with you  Sincerely,        Maribel Muñoz MD        CC: No Recipients  Maribel Muñoz MD  9/21/2020 10:30 AM  Sign when Signing Visit  [facility]: Ms Priyanka Eagle was seen today at 38w6d for NST (found under the pregnancy episode) which I reviewed the RN assessment and agree  Delivery recommended by end of 39th week for GDMA2      Please don't hesitate to contact our office with any concerns or questions   -Maribel Muñoz MD

## 2020-09-22 ENCOUNTER — DOCUMENTATION (OUTPATIENT)
Dept: PERINATAL CARE | Facility: CLINIC | Age: 40
End: 2020-09-22

## 2020-09-22 ENCOUNTER — TELEPHONE (OUTPATIENT)
Dept: PERINATAL CARE | Facility: CLINIC | Age: 40
End: 2020-09-22

## 2020-09-22 NOTE — TELEPHONE ENCOUNTER
Spoke with patient and confirmed appointment with MFM  1 support person ( must be over age of 25) may accompany patient  Will you and your support person be able to wear a mask ,without a valve , during entire appointment? yes   To minimize your exposure in our waiting area,check in and rooming questions will be done via phone  When you arrive in the parking lot please call the following inside line # prior to entering office:    [facility] [phone] line: 280 [facility] line:  [address] line: [phone]  [place] line:  [phone]  [place] line: [phone]    Have you or your support person traveled outside the state in the last 2 weeks?no   If yes, what state did you travel to? no     Do you or your support person have:  Fever or flu- like symptoms?no  Symptoms of upper respiratory infection like runny nose, sore throat or cough? no  Do you have new headache that you have not had in the past?no  Have you experienced any new shortness of breath recently?no  Do you have any new loss of taste or smell?no  Do you have any new diarrhea, nausea or vomiting?no  Have you recently been in contact with anyone who has been sick or diagnosed with COVID-19 infection?no  Have you been recommended to quarantine because of an exposure to a confirmed positive COVID19 person?no  You and your support person will have temperature screening upon arrival   Patient verbalized understanding of all instructions

## 2020-09-23 ENCOUNTER — ROUTINE PRENATAL (OUTPATIENT)
Dept: OBGYN CLINIC | Facility: CLINIC | Age: 40
End: 2020-09-23

## 2020-09-23 ENCOUNTER — ULTRASOUND (OUTPATIENT)
Dept: PERINATAL CARE | Facility: CLINIC | Age: 40
End: 2020-09-23

## 2020-09-23 ENCOUNTER — ROUTINE PRENATAL (OUTPATIENT)
Dept: PERINATAL CARE | Facility: CLINIC | Age: 40
End: 2020-09-23

## 2020-09-23 VITALS
TEMPERATURE: 97.8 F | WEIGHT: 185.2 LBS | BODY MASS INDEX: 29.77 KG/M2 | HEART RATE: 93 BPM | SYSTOLIC BLOOD PRESSURE: 115 MMHG | DIASTOLIC BLOOD PRESSURE: 74 MMHG | HEIGHT: 66 IN

## 2020-09-23 VITALS
SYSTOLIC BLOOD PRESSURE: 109 MMHG | HEART RATE: 85 BPM | WEIGHT: 185.6 LBS | HEIGHT: 66 IN | DIASTOLIC BLOOD PRESSURE: 67 MMHG | TEMPERATURE: 97.8 F | BODY MASS INDEX: 29.83 KG/M2

## 2020-09-23 DIAGNOSIS — O40.3XX0 POLYHYDRAMNIOS AFFECTING PREGNANCY IN THIRD TRIMESTER: ICD-10-CM

## 2020-09-23 DIAGNOSIS — O24.414 INSULIN CONTROLLED GESTATIONAL DIABETES MELLITUS (GDM) IN THIRD TRIMESTER: Primary | ICD-10-CM

## 2020-09-23 DIAGNOSIS — Z3A.39 39 WEEKS GESTATION OF PREGNANCY: ICD-10-CM

## 2020-09-23 DIAGNOSIS — Z34.93 PRENATAL CARE IN THIRD TRIMESTER: Primary | ICD-10-CM

## 2020-09-23 DIAGNOSIS — O09.523 ELDERLY MULTIGRAVIDA, THIRD TRIMESTER: ICD-10-CM

## 2020-09-23 DIAGNOSIS — Z3A.38 38 WEEKS GESTATION OF PREGNANCY: ICD-10-CM

## 2020-09-23 DIAGNOSIS — O32.0XX0 UNSTABLE FETAL LIE, ANTEPARTUM, SINGLE OR UNSPECIFIED FETUS: ICD-10-CM

## 2020-09-23 DIAGNOSIS — O24.414 INSULIN CONTROLLED GESTATIONAL DIABETES MELLITUS (GDM) IN THIRD TRIMESTER: ICD-10-CM

## 2020-09-23 PROCEDURE — 59025 FETAL NON-STRESS TEST: CPT | Performed by: OBSTETRICS & GYNECOLOGY

## 2020-09-23 PROCEDURE — 76816 OB US FOLLOW-UP PER FETUS: CPT | Performed by: OBSTETRICS & GYNECOLOGY

## 2020-09-23 PROCEDURE — NC001 PR NO CHARGE

## 2020-09-23 PROCEDURE — 99212 OFFICE O/P EST SF 10 MIN: CPT | Performed by: OBSTETRICS & GYNECOLOGY

## 2020-09-23 PROCEDURE — 99215 OFFICE O/P EST HI 40 MIN: CPT | Performed by: NURSE PRACTITIONER

## 2020-09-23 NOTE — PROGRESS NOTES
Assessment & Plan  45 y o  [id number] at 39w1d presenting for routine prenatal visit  Pt had an U/S in the [facility] today, was told the baby was transverse and >8 lbs  Pt was counseled in details about the risks and benefits of a version vs a C/S  Pt states she would like a C/S and a sterilization procedure  Explained recovery is generally longer than recovery for a vaginal birth, increased blood loss, infection and possible damage to other organs  Pt and spouse states they are aware but feel the baby moves to much and do not want the risk of a version and head not staying down  They do not want more children and want a sterilization procedure  Pt states all FBS are < 90 and all 2 hour post prandial BS are < 120 in the 110's or lower  WNL respiratory effort   No fever cough or SOB      Problem List Items Addressed This Visit        Endocrine    Insulin controlled gestational diabetes mellitus (GDM) in third trimester       Other    39 weeks gestation of pregnancy    Relevant Orders    POCT urine dip    Prenatal care in third trimester - Primary        ____________________________________________________________  Subjective  She is without complaint  She denies contractions, loss of fluid, or vaginal bleeding  She feels regular fetal movements         Objective  /74 (BP Location: Left arm, Patient Position: Sitting, Cuff Size: Adult)   Pulse 93   Temp 97 8 °F (36 6 °C) (Temporal)   Ht 5' 6" (1 676 m)   Wt 84 kg (185 lb 3 2 oz)   LMP 01/05/2020   BMI 29 89 kg/m²          Patient's Active Problem List  Patient Active Problem List   Diagnosis    Elderly multigravida, third trimester    39 weeks gestation of pregnancy    Prenatal care in third trimester    Insulin controlled gestational diabetes mellitus (GDM) in third trimester    Consultation for female sterilization    Hyperglycemia in pregnancy    Encounter for injection education    Excessive fetal growth affecting management of mother     BP 115/74 (BP Location: Left arm, Patient Position: Sitting, Cuff Size: Adult)   Pulse 93   Temp 97 8 °F (36 6 °C) (Temporal)   Ht 5' 6" (1 676 m)   Wt 84 kg (185 lb 3 2 oz)   LMP 01/05/2020   BMI 29 89 kg/m²     Plan  If contraction begin call the office or the hospital  Report to Labor and delivery Friday through the emergency room at 530 am  for you C/S  Return in 1 week for an incision check   To call with vaginal bleeding, loss of fluid, regular contractions, decreased fetal movement, other needs or concerns  Pt verbalized understanding of all discussed

## 2020-09-23 NOTE — PROGRESS NOTES
Zoe Hackett has no complaints today  She reports regular fetal movements and denies problems related to hypertension,  labor, or vaginal bleeding  Her most recent ultrasound showed the baby was vertex  She is here with her  who she requested top with interpretation  Problem list:  1  GDM A2-blood sugars on 16 units of NPH at 9:00 a m   2  Advanced maternal age and declined genetic screening    Ultrasound findings: The ultrasound today shows normal interval fetal growth and fluid  Baby is transverse with the fetal head on the maternal right  Estimated fetal weight is 8 pounds 5 ounce  NAIN 24 5  NST is reactive  Pregnancy ultrasound has limitations and is unable to detect all forms of fetal congenital abnormalities  The inaccuracy in the EFW can be off by 1 lb either way in the third trimester  Follow up:  No further follow up ultrasounds are recommended at this time  She will continue twice weekly NST and weekly NAIN till delivery  Recommend delivery by 40 weeks  She is seeing her OB provider today at 4:00 p m     We discussed briefly the option of version which I would recommend as this baby is a similar size at her last baby and that she has polyhydramnios and her baby was vertex last week  I suspect this baby just has an unstable lie and would be a successful version  She will be counseled further in regards to a version at her OB office later today  The majority of time ( greater than 50 percent) was spent on counseling and coordination of care with the patient and her family member  Approximately physician face-to-face time was 15 minutes        Marielos Man MD

## 2020-09-23 NOTE — PATIENT INSTRUCTIONS
LABOR PRECAUTIONS  Call our office at [phone] for any of the following:    * I need to call immediately I if I have even a small amount of LIQUID  leaking from my vagina, with or without contractions  * I need to call if I am BLEEDING an amount equal to or more than a period  A small amount of bloody vaginal discharge is normal at the end of the pregnancy  * I need to call if I am having CONTRACTIONS  every five minutes for at least an hour  I will need a watch in order to time them  I should time them from the beginning of one contraction until the beginning of the next one  * I need to call BEFORE  I go to the hospital    * I need to have a plan for TRANSPORTATION  to get to the hospital when I am in labor  Labor is generally not an emergency which requires an ambulance  FETAL KICK COUNTS    In the third trimester (after 28 weeks gestation) you should be performing fetal kick counts every day  Your baby should move at least 10 times in 2 hours during an active time, once a day  Choose atime of day when your baby is most active  Try to do this around the same time each day  Get into a comfortable position and then write down the time your baby first moves  Count each movement until the baby moves 10 times  These movements include kicks, punches, nudges, flutters, or rolls  This can take anywhere from 5 minutes to 2 hours  Write down the time you feel the baby's 10th movement  If 2 hours has passed and your baby has not moved at least 10 times, you should CALL THE OFFICE RIGHT AWAY  [phone]        PERINEAL / VAGINAL MASSAGE    What can I do now to decrease my chances of tearing during delivery? Massaging around the vaginal opening by you (or your partner), either antepartum (before birth) or during the second stage of labor, can reduce the likelihood of perineal tearing during childbirth    Likewise, the use of warm packs held on the perineum during the pushing stage of labor can reduce the severity of your tear  This will happen during the pushing stage of labor  At home, you can also help reduce the chances of injury that may occur during the birth of your child through perineal massage  When should I do this? Starting around or shortly after 34 weeks of pregnancy, you or your partner should provide 5-10 minutes of vaginal massage 1-4 times per week  How? Use either almond, coconut, or olive oil and water mixture on 1 or 2 fingers (depending on comfort)  Insert finger(s) 3-5cm into the vagina  Apply sweeping downward/sideward pressure from 3 to 9 o'clock for 5-10 minutes, 1-4 times per week  GROUP B STREP    Group B Strep (GBS) is a common vaginal bacteria  Approximately 25% of women normally have GBS bacteria present in the vagina  It is NOT a sexually-transmitted infection  In fact, it is not an infection AT ALL! It is just a normal vaginal bacteria for many women  However, the GBS bacteria can be dangerous to a  baby if the baby is exposed to that particular bacteria during labor and birth AND develops an infection from it  The likelihood of a  GBS infection for a woman who has GBS bacteria in the vagina is about 1%-2%  But if it does occur, a baby could become severely ill  For this reason, we do a vaginal culture (Q-tip swab of the vagina and rectum) for ALL pregnant women at approximately 36 weeks of pregnancy  If the culture shows that there is GBS bacteria present, it is NOT a reason to panic! Because in this situation we will give this woman antibiotics through her IV while she is in labor  When a mother is treated with antibiotics during labor and delivery, her baby ALMOST NEVER becomes infected with GBS bacteria  Coronavirus (XMDGL-17) pregnancy FAQs: Medical experts answer your questions  From [employer] cy  com/0_coronavirus-covid-19-pregnancy-faq-medical-mydwqhm-tfymmd-gn_35078647  bc (courtesy of [facility])  As of 3/18/20  By Wilbert Acosta 40  Medically reviewed by Society for Maternal-Fetal Medicine       We asked parents-to-be to send us their most pressing questions about coronavirus (COVID-19)  Among them: Is it still safe to deliver in a hospital? What if my ob-gyn has the virus? We sent those questions to the top docs at the Society for Maternal-Fetal Medicine  Here are their answers  The coronavirus (COVID-19) pandemic has been declared a national emergency in the [facility] by [employer]  Moms-to-be like you are concerned about everything from getting medicines to managing disruptions at work  But above and beyond any worry about lifestyle changes is a focus on your health and the impact of COVID-19 on your pregnancy  We asked obstetrics doctors who handle the most complicated pregnancy issues to answer your questions about the coronavirus  Here are their responses, provided by Dr Rene Edmondson and her colleagues at the [employer]  Am I at more risk for COVID-19 if I'm pregnant? We don't know  Pregnancy does change your immune system in ways that might make you more susceptible to viral respiratory infections like COVID-19  If you become infected, you might also be at higher risk for more severe illness compared to the general population  Although this does not appear to be the case with COVID-19, based on limited data so far, a higher risk has been true for pregnant women with other coronavirus infections (SARS-CoV and MERS-CoV) and other viral respiratory infections, such as flu  It's important to take preventive actions to avoid infection, such as washing your hands often and avoiding people who are sick  How might coronavirus affect my pregnancy? Again, we don't know  Women with other coronavirus infections (such as SARS-CoV) did not have miscarriage or stillbirth at higher rates than the general population    We know that having other respiratory viral infections during pregnancy, such as flu, has been associated with problems like low birth weight and  birth  Also, having a high fever early in pregnancy may increase the risk of certain birth defects  Could I transmit coronavirus to my baby during pregnancy or delivery? We don't know whether you could transmit COVID-19 to your baby before or during delivery  However, among the few case studies of infants born to mothers with COVID-23 published in peer-reviewed literature, none of the infants tested positive for the virus  Also, there was no virus detected in samples of amniotic fluid or breast milk  There have been a few reports of newborns as young as a few days old with infection, suggesting that a mother can transmit the infection to her infant through close contact after delivery  There have been no reports of mother-to-baby transmission for other coronaviruses (MERS-CoV and SARS-CoV), although only limited information is available  Is it safe for me to deliver at a hospital where there have been COVID-19 cases? Yes  We know that COVID-19 is a very scary virus  The good news is that hospitals are taking great precautions to keep patients and healthcare providers safe  When a patient is even suspected to have COVID-19, they are placed in a negative pressure room  (Think of these rooms as vacuums that suck and filter the air so it's safe for the other people in the hospital)  This makes it possible for you to deliver at the hospital without putting you or your baby at risk  Hospitals are also implementing stricter visiting policies to keep patients safe  It's worth calling your hospital to check if there are any new regulations to be aware of  What plans should I make now in case the hospital system is overwhelmed when it's time for me to deliver? This is a great question to talk with your doctor or midwife about when you're 34 to 36 weeks pregnant   Every hospital is making different plans for dealing with this scenario  I work in healthcare  Should I ask my doctor to excuse me from work until the baby is born? What if I work in a school, the travel [employer], or some other high-risk setting? Healthcare facilities should take care to limit the exposure of pregnant employees to patients with confirmed or suspected COVID-19, just as they would with other infectious cases  If you continue working, be sure to follow the CDC's risk assessment and infection control guidelines  If you work in a school, travel [employer], or other high-risk setting, talk with your employer about what it's doing to protect employees and minimize infection risks  Wash your hands often  What if my OB gets COVID-19? If your doctor or midwife tests positive for COVID-19, they will need to be quarantined until they recover and are no longer at risk of transmitting the virus  In this case, you'll be assigned to another OB in your doctor's practice (or you may choose another practitioner yourself)  Ask your new OB or your doctor's office if you should self-quarantine or be tested for the virus  (It will depend on when you last saw your provider and when that person tested positive )    Should we hold off on trying to conceive because of COVID-19? At this time, there's no reason to hold off on trying to get pregnant, but the data we have is really limited  For example, we don't think the virus causes birth defects or increases your risk of miscarriage  But we don't know whether you could transmit COVID-19 to your baby before or during delivery  We also don't know if the virus lives in semen or can be sexually transmitted  We have a babymoon scheduled in the next few months - should we cancel? If you're planning to travel internationally or on a cruise, you should strongly consider canceling   At this time, the virus has reached more than 140 countries, and there are travel bans to [place], most of [place], and [place]  Places where large numbers of people gather are at highest risk, especially airports and cruise ships  If you're planning travel in the [place] , note that any travel setting increases your risk of exposure, and there may be travel bans even in [place] by the time you're scheduled to go  Even if you're state is not blocked, you'll definitely want to avoid traveling to communities where the virus is spreading  To find out where these places are, check The [place] Times map based on CDC data  For the most current advice to help you avoid exposure, check the CDC's COVID-19 travel page  Will the hospital separate me from my  and keep the baby in quarantine? If you test positive for COVID-19 or have been exposed but have no symptoms, the CDC, [employer] of Obstetricians and Gynecologists, and the Society for [employer] all recommend that you be  from your baby to decrease the risk of transmission to the baby  This would last until you are no longer at risk of transmitting the virus  If you do not have COVID-19 and have not been exposed to the virus, the hospital will not separate you from your   My hospital is restricting visitors and only allowing one support person  If my support person leaves after the delivery, will they be allowed to come back? Every hospital has different policies  Contact your hospital or labor and delivery unit a week or so before delivery to get the most up-to-date restrictions  In general, if your support person needs to leave, they would be allowed back unless they knew they were exposed to COVID-19 after leaving your company  [facility] understands that the coronavirus (COVID-19) pandemic is an evolving story and that your questions will change over time   We'll continue asking moms and dads in our Community what they want to know, and we'll get the answers from experts to keep them - and you - informed and supported  My mom was planning to fly here to help me care for my new baby after delivery  Should I tell her not to come? Yes  If your mom is over 61 or has any serious chronic medical conditions (such as heart disease, lung disease, or diabetes), she is at higher risk of serious illness from COVID-19 and should avoid air travel  And remember that any travel setting increases a person's risk of exposure  So, it may be risky to have her around the baby after she has been traveling  For the most current advice on traveling, check the [facility]'s COVID-19 travel page  [facility] understands that the coronavirus pandemic is an evolving story and that your questions will change over time  We'll continue asking moms and dads in our Community what they want to know, and we'll get the answers from experts to keep them - and you - informed and supported      If contraction begin call the office or the hospital  Report to Labor and delivery Friday through the emergency room at 530 am  for you C/S  Return in 1 week for an incision check

## 2020-09-23 NOTE — LETTER
2020     Lukas Vigil MD  [facility] [id number]    Patient: Edwin Conklin   YOB: 1980   Date of Visit: 2020       Dear Dr Tana Leach: Thank you for referring Edwin Conklin to me for evaluation  Below are my notes for this consultation  If you have questions, please do not hesitate to call me  I look forward to following your patient along with you  Sincerely,        Sherri Pagan MD        CC: RAFAEL Kwong MD [place], MD  2020 11:09 AM  Sign when Signing Visit  Edwin Conklin has no complaints today  She reports regular fetal movements and denies problems related to hypertension,  labor, or vaginal bleeding  Her most recent ultrasound showed the baby was vertex  She is here with her  who she requested top with interpretation  Problem list:  1  GDM A2-blood sugars on 16 units of NPH at 9:00 a m   2  Advanced maternal age and declined genetic screening    Ultrasound findings: The ultrasound today shows normal interval fetal growth and fluid  Baby is transverse with the fetal head on the maternal right  Estimated fetal weight is 8 pounds 5 ounce  NAIN 24 5  NST is reactive  Pregnancy ultrasound has limitations and is unable to detect all forms of fetal congenital abnormalities  The inaccuracy in the EFW can be off by 1 lb either way in the third trimester  Follow up:  No further follow up ultrasounds are recommended at this time  She will continue twice weekly NST and weekly NAIN till delivery  Recommend delivery by 40 weeks  She is seeing her OB provider today at 4:00 p m     We discussed briefly the option of version which I would recommend as this baby is a similar size at her last baby and that she has polyhydramnios and her baby was vertex last week  I suspect this baby just has an unstable lie and would be a successful version    She will be counseled further in regards to a version at her OB office later today  The majority of time ( greater than 50 percent) was spent on counseling and coordination of care with the patient and her family member  Approximately physician face-to-face time was 15 minutes        Bello Christie MD

## 2020-09-25 ENCOUNTER — ANESTHESIA (INPATIENT)
Dept: LABOR AND DELIVERY | Facility: HOSPITAL | Age: 40
DRG: 539 | End: 2020-09-25
Payer: COMMERCIAL

## 2020-09-25 ENCOUNTER — HOSPITAL ENCOUNTER (INPATIENT)
Facility: HOSPITAL | Age: 40
LOS: 2 days | Discharge: HOME | DRG: 539 | End: 2020-09-27
Attending: OBSTETRICS & GYNECOLOGY | Admitting: OBSTETRICS & GYNECOLOGY
Payer: COMMERCIAL

## 2020-09-25 VITALS — HEART RATE: 71 BPM

## 2020-09-25 DIAGNOSIS — Z3A.39 39 WEEKS GESTATION OF PREGNANCY: ICD-10-CM

## 2020-09-25 DIAGNOSIS — Z98.891 STATUS POST PRIMARY LOW TRANSVERSE CESAREAN SECTION: ICD-10-CM

## 2020-09-25 PROBLEM — O32.2XX0 TRANSVERSE LIE OF FETUS: Status: ACTIVE | Noted: 2020-09-25

## 2020-09-25 LAB
ABO GROUP BLD: NORMAL
BASE EXCESS BLDCOA CALC-SCNC: -4.3 MMOL/L (ref 3–11)
BASE EXCESS BLDCOV CALC-SCNC: -3.5 MMOL/L (ref 1–9)
BLD GP AB SCN SERPL QL: NEGATIVE
ERYTHROCYTE [DISTWIDTH] IN BLOOD BY AUTOMATED COUNT: 12.4 % (ref 11.6–15.1)
ERYTHROCYTE [DISTWIDTH] IN BLOOD BY AUTOMATED COUNT: 12.5 % (ref 11.6–15.1)
GLUCOSE SERPL-MCNC: 87 MG/DL (ref 65–140)
HCO3 BLDCOA-SCNC: 23.4 MMOL/L (ref 17.3–27.3)
HCO3 BLDCOV-SCNC: 22.2 MMOL/L (ref 12.2–28.6)
HCT VFR BLD AUTO: 31.4 % (ref 34.8–46.1)
HCT VFR BLD AUTO: 36.8 % (ref 34.8–46.1)
HGB BLD-MCNC: 10.5 G/DL (ref 11.5–15.4)
HGB BLD-MCNC: 12.4 G/DL (ref 11.5–15.4)
MCH RBC QN AUTO: 32.5 PG (ref 26.8–34.3)
MCH RBC QN AUTO: 32.9 PG (ref 26.8–34.3)
MCHC RBC AUTO-ENTMCNC: 33.4 G/DL (ref 31.4–37.4)
MCHC RBC AUTO-ENTMCNC: 33.7 G/DL (ref 31.4–37.4)
MCV RBC AUTO: 97 FL (ref 82–98)
MCV RBC AUTO: 98 FL (ref 82–98)
O2 CT VFR BLDCOA CALC: 8.1 ML/DL
OXYHGB MFR BLDCOA: 34.5 %
OXYHGB MFR BLDCOV: 74 %
PCO2 BLDCOA: 52.4 MM[HG] (ref 30–60)
PCO2 BLDCOV: 42.2 MM HG (ref 27–43)
PH BLDCOA: 7.27 [PH] (ref 7.23–7.43)
PH BLDCOV: 7.34 [PH] (ref 7.19–7.49)
PLATELET # BLD AUTO: 146 THOUSANDS/UL (ref 149–390)
PLATELET # BLD AUTO: 170 THOUSANDS/UL (ref 149–390)
PMV BLD AUTO: 10.9 FL (ref 8.9–12.7)
PMV BLD AUTO: 11 FL (ref 8.9–12.7)
PO2 BLDCOA: 17.6 MM HG (ref 5–25)
PO2 BLDCOV: 32 MM HG (ref 15–45)
RBC # BLD AUTO: 3.19 MILLION/UL (ref 3.81–5.12)
RBC # BLD AUTO: 3.81 MILLION/UL (ref 3.81–5.12)
RH BLD: POSITIVE
RPR SER QL: NORMAL
SAO2 % BLDCOV: 18.5 ML/DL
SPECIMEN EXPIRATION DATE: NORMAL
WBC # BLD AUTO: 10.84 THOUSAND/UL (ref 4.31–10.16)
WBC # BLD AUTO: 8.7 THOUSAND/UL (ref 4.31–10.16)

## 2020-09-25 PROCEDURE — 86900 BLOOD TYPING SEROLOGIC ABO: CPT | Performed by: OBSTETRICS & GYNECOLOGY

## 2020-09-25 PROCEDURE — 82948 REAGENT STRIP/BLOOD GLUCOSE: CPT

## 2020-09-25 PROCEDURE — 85027 COMPLETE CBC AUTOMATED: CPT | Performed by: STUDENT IN AN ORGANIZED HEALTH CARE EDUCATION/TRAINING PROGRAM

## 2020-09-25 PROCEDURE — NC001 PR NO CHARGE: Performed by: STUDENT IN AN ORGANIZED HEALTH CARE EDUCATION/TRAINING PROGRAM

## 2020-09-25 PROCEDURE — 58611 LIGATE OVIDUCT(S) ADD-ON: CPT | Performed by: OBSTETRICS & GYNECOLOGY

## 2020-09-25 PROCEDURE — 86850 RBC ANTIBODY SCREEN: CPT | Performed by: OBSTETRICS & GYNECOLOGY

## 2020-09-25 PROCEDURE — 59515 CESAREAN DELIVERY: CPT | Performed by: OBSTETRICS & GYNECOLOGY

## 2020-09-25 PROCEDURE — 82805 BLOOD GASES W/O2 SATURATION: CPT | Performed by: OBSTETRICS & GYNECOLOGY

## 2020-09-25 PROCEDURE — 0UT70ZZ RESECTION OF BILATERAL FALLOPIAN TUBES, OPEN APPROACH: ICD-10-PCS | Performed by: STUDENT IN AN ORGANIZED HEALTH CARE EDUCATION/TRAINING PROGRAM

## 2020-09-25 PROCEDURE — 4A1HXCZ MONITORING OF PRODUCTS OF CONCEPTION, CARDIAC RATE, EXTERNAL APPROACH: ICD-10-PCS | Performed by: OBSTETRICS & GYNECOLOGY

## 2020-09-25 PROCEDURE — 86592 SYPHILIS TEST NON-TREP QUAL: CPT | Performed by: OBSTETRICS & GYNECOLOGY

## 2020-09-25 PROCEDURE — 85027 COMPLETE CBC AUTOMATED: CPT | Performed by: OBSTETRICS & GYNECOLOGY

## 2020-09-25 PROCEDURE — 86901 BLOOD TYPING SEROLOGIC RH(D): CPT | Performed by: OBSTETRICS & GYNECOLOGY

## 2020-09-25 PROCEDURE — 88302 TISSUE EXAM BY PATHOLOGIST: CPT | Performed by: PATHOLOGY

## 2020-09-25 PROCEDURE — 99024 POSTOP FOLLOW-UP VISIT: CPT | Performed by: STUDENT IN AN ORGANIZED HEALTH CARE EDUCATION/TRAINING PROGRAM

## 2020-09-25 RX ORDER — OXYCODONE HYDROCHLORIDE AND ACETAMINOPHEN 5; 325 MG/1; MG/1
2 TABLET ORAL EVERY 4 HOURS PRN
Status: DISCONTINUED | OUTPATIENT
Start: 2020-09-26 | End: 2020-09-27 | Stop reason: HOSPADM

## 2020-09-25 RX ORDER — OXYCODONE HYDROCHLORIDE AND ACETAMINOPHEN 5; 325 MG/1; MG/1
2 TABLET ORAL EVERY 4 HOURS PRN
Status: ACTIVE | OUTPATIENT
Start: 2020-09-25 | End: 2020-09-26

## 2020-09-25 RX ORDER — DIPHENHYDRAMINE HCL 25 MG
25 TABLET ORAL EVERY 6 HOURS PRN
Status: DISCONTINUED | OUTPATIENT
Start: 2020-09-26 | End: 2020-09-27 | Stop reason: HOSPADM

## 2020-09-25 RX ORDER — NALOXONE HYDROCHLORIDE 0.4 MG/ML
0.1 INJECTION, SOLUTION INTRAMUSCULAR; INTRAVENOUS; SUBCUTANEOUS
Status: ACTIVE | OUTPATIENT
Start: 2020-09-25 | End: 2020-09-26

## 2020-09-25 RX ORDER — ONDANSETRON 2 MG/ML
4 INJECTION INTRAMUSCULAR; INTRAVENOUS EVERY 4 HOURS PRN
Status: ACTIVE | OUTPATIENT
Start: 2020-09-25 | End: 2020-09-26

## 2020-09-25 RX ORDER — MORPHINE SULFATE 0.5 MG/ML
INJECTION, SOLUTION EPIDURAL; INTRATHECAL; INTRAVENOUS
Status: COMPLETED
Start: 2020-09-25 | End: 2020-09-25

## 2020-09-25 RX ORDER — ONDANSETRON 2 MG/ML
4 INJECTION INTRAMUSCULAR; INTRAVENOUS EVERY 8 HOURS PRN
Status: DISCONTINUED | OUTPATIENT
Start: 2020-09-25 | End: 2020-09-25

## 2020-09-25 RX ORDER — OXYTOCIN/RINGER'S LACTATE 30/500 ML
PLASTIC BAG, INJECTION (ML) INTRAVENOUS
Status: COMPLETED
Start: 2020-09-25 | End: 2020-09-25

## 2020-09-25 RX ORDER — SIMETHICONE 80 MG
80 TABLET,CHEWABLE ORAL 4 TIMES DAILY PRN
Status: DISCONTINUED | OUTPATIENT
Start: 2020-09-25 | End: 2020-09-27 | Stop reason: HOSPADM

## 2020-09-25 RX ORDER — OXYCODONE HYDROCHLORIDE AND ACETAMINOPHEN 5; 325 MG/1; MG/1
1 TABLET ORAL EVERY 4 HOURS PRN
Status: DISCONTINUED | OUTPATIENT
Start: 2020-09-26 | End: 2020-09-27 | Stop reason: HOSPADM

## 2020-09-25 RX ORDER — KETOROLAC TROMETHAMINE 30 MG/ML
30 INJECTION, SOLUTION INTRAMUSCULAR; INTRAVENOUS EVERY 6 HOURS PRN
Status: DISPENSED | OUTPATIENT
Start: 2020-09-25 | End: 2020-09-26

## 2020-09-25 RX ORDER — ONDANSETRON 2 MG/ML
INJECTION INTRAMUSCULAR; INTRAVENOUS AS NEEDED
Status: DISCONTINUED | OUTPATIENT
Start: 2020-09-25 | End: 2020-09-25

## 2020-09-25 RX ORDER — DIPHENHYDRAMINE HYDROCHLORIDE 50 MG/ML
25 INJECTION INTRAMUSCULAR; INTRAVENOUS EVERY 6 HOURS PRN
Status: ACTIVE | OUTPATIENT
Start: 2020-09-25 | End: 2020-09-26

## 2020-09-25 RX ORDER — IBUPROFEN 600 MG/1
600 TABLET ORAL EVERY 6 HOURS PRN
Status: DISCONTINUED | OUTPATIENT
Start: 2020-09-25 | End: 2020-09-27 | Stop reason: HOSPADM

## 2020-09-25 RX ORDER — SODIUM CHLORIDE, SODIUM LACTATE, POTASSIUM CHLORIDE, CALCIUM CHLORIDE 600; 310; 30; 20 MG/100ML; MG/100ML; MG/100ML; MG/100ML
125 INJECTION, SOLUTION INTRAVENOUS CONTINUOUS
Status: DISCONTINUED | OUTPATIENT
Start: 2020-09-25 | End: 2020-09-25

## 2020-09-25 RX ORDER — FENTANYL CITRATE 50 UG/ML
INJECTION, SOLUTION INTRAMUSCULAR; INTRAVENOUS
Status: DISPENSED
Start: 2020-09-25 | End: 2020-09-25

## 2020-09-25 RX ORDER — METOCLOPRAMIDE HYDROCHLORIDE 5 MG/ML
5 INJECTION INTRAMUSCULAR; INTRAVENOUS EVERY 6 HOURS PRN
Status: ACTIVE | OUTPATIENT
Start: 2020-09-25 | End: 2020-09-26

## 2020-09-25 RX ORDER — DIAPER,BRIEF,INFANT-TODD,DISP
1 EACH MISCELLANEOUS DAILY PRN
Status: DISCONTINUED | OUTPATIENT
Start: 2020-09-25 | End: 2020-09-27 | Stop reason: HOSPADM

## 2020-09-25 RX ORDER — KETOROLAC TROMETHAMINE 30 MG/ML
INJECTION, SOLUTION INTRAMUSCULAR; INTRAVENOUS AS NEEDED
Status: DISCONTINUED | OUTPATIENT
Start: 2020-09-25 | End: 2020-09-25

## 2020-09-25 RX ORDER — OXYTOCIN/RINGER'S LACTATE 30/500 ML
PLASTIC BAG, INJECTION (ML) INTRAVENOUS CONTINUOUS PRN
Status: DISCONTINUED | OUTPATIENT
Start: 2020-09-25 | End: 2020-09-25

## 2020-09-25 RX ORDER — SODIUM CHLORIDE 450 MG/100ML
125 INJECTION, SOLUTION INTRAVENOUS CONTINUOUS
Status: DISCONTINUED | OUTPATIENT
Start: 2020-09-25 | End: 2020-09-27 | Stop reason: HOSPADM

## 2020-09-25 RX ORDER — PHENYLEPHRINE HCL IN 0.9% NACL 1 MG/10 ML
SYRINGE (ML) INTRAVENOUS
Status: DISPENSED
Start: 2020-09-25 | End: 2020-09-25

## 2020-09-25 RX ORDER — CEFAZOLIN SODIUM 2 G/50ML
2000 SOLUTION INTRAVENOUS ONCE
Status: COMPLETED | OUTPATIENT
Start: 2020-09-25 | End: 2020-09-25

## 2020-09-25 RX ORDER — DOCUSATE SODIUM 100 MG/1
100 CAPSULE, LIQUID FILLED ORAL 2 TIMES DAILY
Status: DISCONTINUED | OUTPATIENT
Start: 2020-09-25 | End: 2020-09-27 | Stop reason: HOSPADM

## 2020-09-25 RX ORDER — BUPIVACAINE HYDROCHLORIDE 7.5 MG/ML
INJECTION, SOLUTION INTRASPINAL AS NEEDED
Status: DISCONTINUED | OUTPATIENT
Start: 2020-09-25 | End: 2020-09-25

## 2020-09-25 RX ORDER — ACETAMINOPHEN 325 MG/1
650 TABLET ORAL EVERY 6 HOURS PRN
Status: DISCONTINUED | OUTPATIENT
Start: 2020-09-25 | End: 2020-09-27 | Stop reason: HOSPADM

## 2020-09-25 RX ORDER — ONDANSETRON 2 MG/ML
4 INJECTION INTRAMUSCULAR; INTRAVENOUS EVERY 8 HOURS PRN
Status: DISCONTINUED | OUTPATIENT
Start: 2020-09-26 | End: 2020-09-27 | Stop reason: HOSPADM

## 2020-09-25 RX ORDER — DEXAMETHASONE SODIUM PHOSPHATE 4 MG/ML
8 INJECTION, SOLUTION INTRA-ARTICULAR; INTRALESIONAL; INTRAMUSCULAR; INTRAVENOUS; SOFT TISSUE ONCE AS NEEDED
Status: ACTIVE | OUTPATIENT
Start: 2020-09-25 | End: 2020-09-26

## 2020-09-25 RX ORDER — CALCIUM CARBONATE 200(500)MG
1000 TABLET,CHEWABLE ORAL 3 TIMES DAILY PRN
Status: DISCONTINUED | OUTPATIENT
Start: 2020-09-25 | End: 2020-09-27 | Stop reason: HOSPADM

## 2020-09-25 RX ORDER — MORPHINE SULFATE 0.5 MG/ML
INJECTION, SOLUTION EPIDURAL; INTRATHECAL; INTRAVENOUS AS NEEDED
Status: DISCONTINUED | OUTPATIENT
Start: 2020-09-25 | End: 2020-09-25

## 2020-09-25 RX ORDER — OXYTOCIN/RINGER'S LACTATE 30/500 ML
1-30 PLASTIC BAG, INJECTION (ML) INTRAVENOUS CONTINUOUS
Status: DISCONTINUED | OUTPATIENT
Start: 2020-09-25 | End: 2020-09-27 | Stop reason: HOSPADM

## 2020-09-25 RX ORDER — HYDROMORPHONE HCL/PF 1 MG/ML
1 SYRINGE (ML) INJECTION EVERY 2 HOUR PRN
Status: DISCONTINUED | OUTPATIENT
Start: 2020-09-26 | End: 2020-09-27 | Stop reason: HOSPADM

## 2020-09-25 RX ADMIN — SODIUM CHLORIDE 125 ML/HR: 0.45 INJECTION, SOLUTION INTRAVENOUS at 18:36

## 2020-09-25 RX ADMIN — Medication 62.5 MILLI-UNITS/MIN: at 10:34

## 2020-09-25 RX ADMIN — CEFAZOLIN SODIUM 2000 MG: 2 SOLUTION INTRAVENOUS at 07:23

## 2020-09-25 RX ADMIN — BUPIVACAINE HYDROCHLORIDE IN DEXTROSE 2 ML: 7.5 INJECTION, SOLUTION SUBARACHNOID at 08:06

## 2020-09-25 RX ADMIN — KETOROLAC TROMETHAMINE 30 MG: 30 INJECTION, SOLUTION INTRAMUSCULAR at 09:27

## 2020-09-25 RX ADMIN — DOCUSATE SODIUM 100 MG: 100 CAPSULE, LIQUID FILLED ORAL at 18:32

## 2020-09-25 RX ADMIN — PHENYLEPHRINE HYDROCHLORIDE 100 MCG: 10 INJECTION INTRAVENOUS at 08:24

## 2020-09-25 RX ADMIN — Medication 250 MILLI-UNITS/MIN: at 08:33

## 2020-09-25 RX ADMIN — SODIUM CHLORIDE: 0.45 INJECTION, SOLUTION INTRAVENOUS at 08:29

## 2020-09-25 RX ADMIN — PHENYLEPHRINE HYDROCHLORIDE 100 MCG: 10 INJECTION INTRAVENOUS at 08:12

## 2020-09-25 RX ADMIN — ONDANSETRON 4 MG: 2 INJECTION INTRAMUSCULAR; INTRAVENOUS at 08:35

## 2020-09-25 RX ADMIN — MORPHINE SULFATE 0.1 MG: 0.5 INJECTION, SOLUTION EPIDURAL; INTRATHECAL; INTRAVENOUS at 08:06

## 2020-09-25 RX ADMIN — PHENYLEPHRINE HYDROCHLORIDE 100 MCG: 10 INJECTION INTRAVENOUS at 08:37

## 2020-09-25 RX ADMIN — KETOROLAC TROMETHAMINE 30 MG: 30 INJECTION, SOLUTION INTRAMUSCULAR at 22:23

## 2020-09-25 RX ADMIN — SODIUM CHLORIDE: 0.45 INJECTION, SOLUTION INTRAVENOUS at 08:14

## 2020-09-25 RX ADMIN — SODIUM CHLORIDE: 0.45 INJECTION, SOLUTION INTRAVENOUS at 07:59

## 2020-09-25 RX ADMIN — PHENYLEPHRINE HYDROCHLORIDE 100 MCG: 10 INJECTION INTRAVENOUS at 08:07

## 2020-09-25 RX ADMIN — PHENYLEPHRINE HYDROCHLORIDE 100 MCG: 10 INJECTION INTRAVENOUS at 08:10

## 2020-09-25 NOTE — OP NOTE
OPERATIVE REPORT  PATIENT NAME: Sandi Grubbs    :  1980  MRN: [id number]  Pt Location: [facility] L&D OR ROOM 01    SURGERY DATE: 2020    Surgeon(s) and Role:     * Nate Reyes MD - Assisting     * Kirsten Viramontes MD - Nic Campbell MD - Observing    Preop Diagnosis:  Malpresentation  Insulin controlled gestational diabetes  Advanced maternal age    Procedure(s) (LRB):   SECTION () (N/A)    Specimen(s):  ID Type Source Tests Collected by Time Destination   1 :  Tissue Fallopian Tube, Left TISSUE EXAM Audra Carmen MD 2020    2 :  Tissue Fallopian Tube, Right TISSUE EXAM Audra Carmen MD 2020 07    A :  Cord Blood Cord BLOOD GAS, VENOUS, CORD Audra Carmen MD 2020    B :  Cord Blood Cord BLOOD GAS, ARTERIAL, CORD Audra Carmen MD 2020 07    C :  Tissue (Placenta on Hold) OB Only Placenta PLACENTA IN STORAGE Audra Carmen MD 2020 1433        Estimated Blood Loss:   500cc    QBL  1125cc    Drains:  Urethral Catheter Non-latex 16 Fr  (Active)   Site Assessment Clean;Skin intact 20 08   Collection Container Standard drainage bag 20 08   Irrigant Normal saline 20 0827   Number of days: 0       Anesthesia Type:   Spinal    Operative Indications: Malpresentation    Operative Findings:  1  Viable male  at 18 with APGARs of 9 and 9 at 1 and 5 minutes  Fetus weighted 9lb 0oz  2  Normal intact placenta with centrally inserted 3VC  3  Normal uterus, bilateral tubes and ovaries      Umbilical Cord Venous Blood Gas:  Results from last 7 days   Lab Units 20  07   PH COV  7 338   PCO2 COV mm HG 42 2   HCO3 COV mmol/L 22 2   BASE EXC COV mmol/L -3 5*   O2 CT CD VB mL/dL 18 5   O2 HGB, VENOUS CORD % 71 8     Umbilical Cord Arterial Blood Gas:  Results from last 7 days   Lab Units 20  07   PH COA  7 267   PCO2 COA  52 4   PO2 COA mm HG 17 6   HCO3 COA mmol/L 23 4   BASE EXC COA mmol/L -4 3*   O2 CONTENT CORD ART ml/dl 8 1   O2 HGB, ARTERIAL CORD % 34 5       Procedure: The patient was taken to the operating room  Spinal anesthesia was adequately established and Ancef was given for preoperative prophylaxis  The patient was then placed in a supine position with a left lateral tilt  Miranda catheter was placed in sterile fashion  Fetal heart tones were noted to be normal  The patient was then prepped with chloraprep for abdominal prep and betadine for vaginal prep and draped in the usual sterile fashion for a Pfannenstiel skin incision  An incision was made in the skin with a surgical scalpel and sharp dissection was carried out over subsequent layers of tissue including the fascia, followed by the Bovie electrocautery for hemostasis  The fascia was incised at the midline and the fascial incision was extended bilaterally using the curved Han scissors  The superior edge of the fascial incision was grasped with Kocher clamps, tented up and the underlying rectus muscles were dissected off bluntly and sharply using the scalpel  The inferior edge of the fascia was similarly dissected  The rectus muscles were then divided at midline and the peritoneum was identified, tented up at its upper margin taking care to avoid the bladder, and then entered  The peritoneal incision was extended superiorly and inferiorly  The Lui Salberto retractor was inserted and a transverse incision was made in the lower uterine segment using a new surgical blade  The uterine incision was extended cephalad and caudal using blunt dissection  The surgeon's hand was placed into the uterine cavity  The fetus was noted to be in oblique breech presentation and the both feet were grasped  The amniotic sac was entered and the amniotic fluid was noted to be clear  The feet were delivered through the uterine incision up to the sacrum    A towel was then used to grasp the fetal hips and deliver up to the scapula  The arms were swept across the fetal chest and delivered one after the other  The head was flexed, the surgeon's finger was placed int he fetal mouth and the head was delivered with the assistance of fundal pressure  The infant's oral and nasal passages were bulb suctioned  After delivery the cord was doubly clamped and cut  The infant was then passed off the table to the awaiting  staff  Venous and arterial blood gas, cord blood, and portion of cord was obtained for analysis and routine blood testing  The placenta then delivered spontaneously  The placenta was noted to be intact with a centrally inserted three-vessel cord  Oxytocin was administered by IV infusion to enhance uterine contraction  The uterus was exteriorized and cleared of all clots and debris by blunt curretage with a moist lap sponge  The uterine incision was reapproximated using a 0 Vicryl in a running locked fashion  A horizontal vertical imbricating stitch with 0 Vicryl was applied  The uterine incision was examined and noted to be hemostatic  The left fallopian tube was grasped at its fimbriated end with a Lucas and elevated to visualize the mesosalpinx  Bovie electrocautery was used to open the mesosalpinx, and 2-0 chromic was used to ligate the vessels and fimbriated ends  Bovie electrocautery was then used to excise the whole tube pproximately 2cm from the cornua, with care to avoid the ovarian vasculature  Specimen was sent for pathology  Attention was then turned to the contralateral tube, which was amputated in similar fashion  Good hemostasis was confirmed following salpingectomy  At this point, the posterior cul-de-sac was cleared of all clots  The uterus was replaced into the abdomen, Luis Alberto retractor was removed, and the pericolic gutters were cleared of all clots  The uterine incision and bilateral tubal ligation sites were once again reexamined and noted to be hemostatic   The fascia was then reapproximated using 0 Vicryl in a running nonlocked fashion  The subcutaneous tissue was irrigated and cleared of all clots and debris  Good hemostasis was noted achieved with Bovie electrocautery  The subcutaneous tissue was re-approximated with 3-0 Vicryl suture in interrupted sutures  The skin incision was closed with 4-0 Monocryl  Good hemostasis was noted  Exofin was placed on top of the skin incision in a sterile fashion as a surgical dressing  All needle, sponge, and instrument counts were noted to be correct x 2 at the end of the procedure  The patient was then cleansed and transferred to the recovery room  Overall, the patient tolerated the procedure well and is currently in stable condition in the PACU with her   Dr Jazzmine Matta was present for the entire procedure          Complications:   None apparent    Patient Disposition:  PACU     SIGNATURE: Natalie Pretty MD  DATE: 2020  TIME: 9:55 AM

## 2020-09-25 NOTE — DISCHARGE SUMMARY
Discharge Summary - Jean Rodriguez 45 y o  female MRN: [id number]    Unit/Bed#: L&D 329-01 Encounter: [id number]    Admission Date: 2020     Discharge Date: 2020  Admitting Attending: Dr Kelly Melara  Delivering Attending: Dr Kelly Melara  Discharge Attending: Dr Debora Franklin    Diagnosis:  IUP @ 39w3d   Transverse lie of fetus   A2GDM  Desire for permanent sterilization   Polyhydramnios    Procedures: primary low transverse  section     Complications: none apparent     Pt is a 45 y o   female who was admitted to L&D on 2020 at 39w3d for 1LTCS in the setting of transverse lie of fetus  Patient had previously declined ECV and desired scheduled 1LTCS  She underwent an uncomplicated primary low transverse  section on 2020, resulting in a viable male  delivered at 0831, Weight was 9lbs 0oz with APGARs of 9 and 9 at 1 and 5 minutes  Her Hgb went from 12 4 pre-op to 10 5 post-op  Her postoperative course was uncomplicated  Her godinez was removed POD#1 and she was able to void spontaneously after  She was discharged on POD#2, by which point her pain was controlled and she was tolerating PO  She had appropriate bowel function  She was discharged with standard post partum/ post operative instructions to follow up with her physician in 1 week for an incision check and in 3-6 weeks for a postpartum appointment  Condition at discharge: good     Discharge instructions/Information to patient and family:   -Do not place anything (no partner, tampons or douche) in your vagina for 6 weeks  -You may gradually return to your usual activities as tolerated  Avoid heavy lifting for the first few weeks    -Please do not drive for 1 week if you have no stitches and for 2 weeks if you have stitches or underwent a  delivery     -You may take baths or shower per your preference     -Examine your breasts in the mirror daily and call for redness or tenderness or increased warmth    -Please call us for temperature > 100 4*F or 38* C, worsening pain or a foul discharge  Discharge Medications:   Prenatal vitamin daily for 6 months or the duration of nursing, whichever is longer  Motrin 600 mg orally every 6 hours as needed for pain  Tylenol (over the counter) per bottle directions as needed for pain: do NOT use with percocet  Hydrocortisone cream 1% (over the counter) applied 1-2x daily to hemorrhoids as needed  Roxicodone 5mg every 6 hours as needed for severe pain     Provisions for Follow-Up Care: Follow up with your doctor in 3-4 week for incision site check      Planned Readmission: no

## 2020-09-25 NOTE — ANESTHESIA PROCEDURE NOTES
Spinal Block    Patient location during procedure: OB  Start time: 9/25/2020 8:06 AM  Reason for block: primary anesthetic  Staffing  Anesthesiologist: Jeff Savage DO  Performed: anesthesiologist   Preanesthetic Checklist  Completed: patient identified, site marked, surgical consent, pre-op evaluation, timeout performed, IV checked, risks and benefits discussed and monitors and equipment checked  Spinal Block  Patient position: sitting  Prep: Betadine  Patient monitoring: frequent blood pressure checks and continuous pulse ox  Approach: midline  Location: L3-4  Injection technique: single-shot  Needle  Needle type: pencil-tip   Needle gauge: 24 G  Assessment  Sensory level: T4  Injection Assessment:  negative aspiration for heme, no paresthesia on injection and positive aspiration for clear CSF    Post-procedure:  site cleaned

## 2020-09-25 NOTE — LACTATION NOTE
This note was copied from a baby's chart  Information on hand expression given  Discussed benefits of knowing how to manually express breast including stimulating milk supply, softening nipple for latch and evacuating breast in the event of engorgement  Worked on positioning infant up at chest level and starting to feed infant with nose arriving at the nipple  Then, using areolar compression to achieve a deep latch that is comfortable and exchanges optimum amounts of milk  Laid back with cross cradle on left breast for a few sucks with each latch attempt  Dad supportive at bedside  Encouraged parents to call for assistance, questions, and concerns about breastfeeding  Extension provided

## 2020-09-25 NOTE — LACTATION NOTE
This note was copied from a baby's chart  CONSULT - LACTATION  Baby Boy Daniel Seamus) Velma [place] 0 days male MRN: [id number]    [facility] NURSERY Room / Bed: L&D 316(N)/L&D 316(N) Encounter: [id number]    Maternal Information     MOTHER:  Sivakumar Tapia  Maternal Age: 45 y o    OB History: # 1 - Date: , Sex: Male, Weight: None, GA: None, Delivery: Vaginal, Spontaneous, Apgar1: None, Apgar5: None, Living: Living, Birth Comments: None    # 2 - Date: , Sex: Male, Weight: None, GA: None, Delivery: Vaginal, Spontaneous, Apgar1: None, Apgar5: None, Living: Living, Birth Comments: None    # 3 - Date: , Sex: Male, Weight: None, GA: None, Delivery: Vaginal, Spontaneous, Apgar1: None, Apgar5: None, Living: Living, Birth Comments: None    # 4 - Date: 20, Sex: Male, Weight: 4090 g (9 lb 0 3 oz), GA: 39w3d, Delivery: , Low Transverse, Apgar1: 9, Apgar5: 9, Living: Living, Birth Comments: None   Previouse breast reduction surgery?  No    Lactation history:   Has patient previously breast fed: Yes   How long had patient previously breast fed: a few months   Previous breast feeding complications:       Past Surgical History:   Procedure Laterality Date    CHOLECYSTECTOMY          Birth information:  YOB: 2019   Time of birth: 8:31 AM   Sex: male   Delivery type: , Low Transverse   Birth Weight: 4090 g (9 lb 0 3 oz)   Percent of Weight Change: 0%     Gestational Age: 38w3d   [unfilled]    Assessment     Breast and nipple assessment: denies need for assist at this time    [place] Assessment: baby gaggy at this time    Feeding assessment: feeding well after delivery,blood sugars Within range at this time    LATCH:  Latch: Grasps breast, tongue down, lips flanged, rhythmic sucking   Audible Swallowing: A few with stimulation   Type of Nipple: Everted (After stimulation)   Comfort (Breast/Nipple): Soft/non-tender   Hold (Positioning): Partial assist, teach one side, mother does other, staff holds   [facility] Score: 8          Feeding recommendations:  breast feed on demand     Met with mother and father  Provided mother with Ready, Set, Baby booklet in [address]  Discussed Skin to Skin contact an benefits to mom and baby  Talked about the delay of the first bath until baby has adjusted  Spoke about the benefits of rooming in  Feeding on cue and what that means for recognizing infant's hunger  Avoidance of pacifiers for the first month discussed  Talked about exclusive breastfeeding for the first 6 months  Positioning and latch reviewed as well as showing images of other feeding positions  Discussed the properties of a good latch in any position  Reviewed hand/manual expression  Discussed s/s that baby is getting enough milk and some s/s that breastfeeding dyad may need further help  Gave information on common concerns, what to expect the first few weeks after delivery, preparing for other caregivers, and how partners can help  Resources for support also provided  Dad supportive at bedside(bilingual)Discussed risks for early supplementation: over feeding, longer digestion times, engorgement for mom, lower milk supply for mom, and nipple confusion  Benefits of breast feeding for infant's intestinal tract, less engorgement for mom, protection from multiple disease processes as infant develops, avoidance of over feeding for infant, less nipple confusion, and increased health benefits for mom  Encouraged parents to call for assistance, questions, and concerns about breastfeeding  Extension provided                          Parth Torres RN 9/25/2020 2:13 PM

## 2020-09-25 NOTE — H&P
H&P Exam - Obstetrics   Santhosh Cox 45 y o  female MRN: [id number]  Unit/Bed#: L&D 329-01 Encounter: [id number]      History of Present Illness     Chief Complaint: Elective  Section, Primary with tubal sterilization     HPI:  Santhosh Cox is a 45 y o  [id number] female with an JAKE of 2020, by Ultrasound at 39w3d weeks gestation who is being admitted for  section  Patient was recounseled on risks and benefits of ECV versus  delivery  Patient expressed desire for a  delivery given the risks of the baby spontaneously malpresenting after version, the size of her baby despite it not qualifying as macrosomic, and her desire for tubal ligation  She was counseled on the ability to do a sterilization procedure 6 weeks following delivery and she expressed a desire to proceed with  delivery  Contractions:  Denies  Loss of fluid:  Denies  Vaginal bleeding:  Denies  Fetal movement:  Present    She is [facility] patient  PREGNANCY COMPLICATIONS:   1) A2 GDM  2) advanced maternal age  1) Malposition    OB History    Para Term  AB Living   4 3 3     3   SAB TAB Ectopic Multiple Live Births           3      # Outcome Date GA Lbr Maurizio/2nd Weight Sex Delivery Anes PTL Lv   4 Current            3 Term     M Vag-Spont None  JENA   2 Term     M Vag-Spont   JENA   1 Term     M Vag-Spont   JENA       Baby complications/comments: Aguilar IUP, oblique breech lie, fetal head to maternal right    Review of Systems   Constitutional: Negative  HENT: Negative  Eyes: Negative  Respiratory: Negative  Cardiovascular: Negative  Gastrointestinal: Negative  Endocrine: Negative  Genitourinary: Negative  Musculoskeletal: Negative  Skin: Negative  Neurological: Negative  Psychiatric/Behavioral: Negative          Historical Information   Past Medical History:   Diagnosis Date    No known health problems      Past Surgical History: Procedure Laterality Date    CHOLECYSTECTOMY       Social History   Social History     Substance and Sexual Activity   Alcohol Use Never    Frequency: Never     Social History     Substance and Sexual Activity   Drug Use Never     Social History     Tobacco Use   Smoking Status Never Smoker   Smokeless Tobacco Never Used     Family History: non-contributory    Meds/Allergies    {  Medications Prior to Admission   Medication    Chante Microlet Lancets lancets    Contour Next Test test strip    insulin NPH (HumuLIN N,NovoLIN N) 100 Units/mL subcutaneous injection    Insulin Syringe-Needle U-100 (INSULIN SYRINGE  5CC/30GX5/16") 30G X 5/16" 0 5 ML MISC    Prenatal Vit-Fe Fumarate-FA (PRENATAL VITAMIN PO)      No Known Allergies    OBJECTIVE:    Vitals:   /76 (BP Location: Left arm)   Pulse 76   Temp 98 5 °F (36 9 °C) (Oral)   Resp 16   Ht 5' 6" (1 676 m)   Wt 84 kg (185 lb 3 oz)   LMP 01/05/2020   BMI 29 89 kg/m²   Body mass index is 29 89 kg/m²  Physical Exam  Constitutional:       General: She is not in acute distress  Appearance: She is well-developed  She is not diaphoretic  HENT:      Head: Normocephalic and atraumatic  Eyes:      Pupils: Pupils are equal, round, and reactive to light  Neck:      Musculoskeletal: Normal range of motion  Trachea: No tracheal deviation  Cardiovascular:      Rate and Rhythm: Normal rate and regular rhythm  Heart sounds: Normal heart sounds  No murmur  No friction rub  No gallop  Pulmonary:      Effort: Pulmonary effort is normal  No respiratory distress  Breath sounds: Normal breath sounds  No stridor  No wheezing or rales  Abdominal:      General: There is no distension  Palpations: Abdomen is soft  There is no mass  Tenderness: There is no abdominal tenderness  There is no guarding or rebound  Musculoskeletal: Normal range of motion  General: No tenderness or deformity     Skin:     General: Skin is warm and dry       Coloration: Skin is not pale  Findings: No erythema or rash  Neurological:      Mental Status: She is alert and oriented to person, place, and time        Coordination: Coordination normal    Psychiatric:         Mood and Affect: Mood normal            Fetal heart rate:   Baseline Rate: 130 bpm  Variability: Moderate 6-25 bpm  Accelerations: 15 x 15 or greater  Decelerations: None    [place]:   Contraction Frequency (minutes): occasional  Contraction Duration (seconds):   Contraction Quality: Mild    EFW: 8 5 lbs    GBS: negative    Prenatal Labs:   Blood Type:   Lab Results   Component Value Date/Time    ABO Grouping B 05/08/2020 11:20 AM     , D (Rh type):   Lab Results   Component Value Date/Time    Rh Factor Positive 05/08/2020 11:20 AM     , Antibody Screen: No results found for: ANTIBODYSCR , HCT/HGB:   Lab Results   Component Value Date/Time    Hematocrit 36 8 09/25/2020 05:41 AM    Hemoglobin 12 4 09/25/2020 05:41 AM      , MCV:   Lab Results   Component Value Date/Time    MCV 97 09/25/2020 05:41 AM      , Platelets:   Lab Results   Component Value Date/Time    Platelets 441 33/69/6756 05:41 AM      , 1 hour Glucola:   Lab Results   Component Value Date/Time    Glucose 157 (H) 07/16/2020 11:30 AM   , 3 hour GTT: No results found for: KRNMTGL3SB, Varicella: No results found for: VARICELLAIGG    , Rubella:   Lab Results   Component Value Date/Time    Rubella IgG Quant 71 4 05/08/2020 11:20 AM        , VDRL/RPR:   Lab Results   Component Value Date/Time    RPR Non-Reactive 07/29/2020 11:25 AM      , Urine Culture/Screen:   Lab Results   Component Value Date/Time    Urine Culture 20,000-29,000 cfu/ml Lactobacillus species (A) 05/08/2020 11:20 AM       , Urine Drug Screen: No results found for: AMPHETUR, BARBTUR, BDZUR, THCUR, COCAINEUR, METHADONEUR, OPIATEUR, PCPUR, MTHAMUR, ECSTASYUR, TRICYCLICSUR, Hep B:   Lab Results   Component Value Date/Time    Hepatitis B Surface Ag Non-reactive 2020 11:20 AM     , Hep C: No components found for: HEPCSAG, EXTHEPCSAG   , HIV:   Lab Results   Component Value Date/Time    HIV-1/HIV-2 Ab Non-Reactive 2020 11:20 AM     , Chlamydia: No results found for: EXTCHLAMYDIA  , Gonorrhea:   Lab Results   Component Value Date/Time    N gonorrhoeae, DNA Probe Negative 2020 12:01 PM     , Group B Strep:  No results found for: STREPGRPB       Invasive Devices     Peripheral Intravenous Line            Peripheral IV 20 Right Forearm less than 1 day                  Assessment/Plan     ASSESSMENT:    43yo  at 39w3d weeks gestation who is being admitted for primary  section and sterilization  PLAN:   1) Admit   2) CBC, RPR, Blood Type   3) Contraception: tubal   4) Anesthesia consult for spinal   5) Ancef 2 gm for ppx   6) D/w Dr Oralia Churchill        This patient will be an INPATIENT and I certify the anticipated length of stay is >2 Midnights        Abelardo Kelley MD  2020  6:48 AM

## 2020-09-25 NOTE — DISCHARGE INSTRUCTIONS
Cesárea   LO QUE USTED DEBE SABER:   Un parto por cesárea es vazquez cirugía abdominal que se realiza para extraer a benson bebé  Existen muchas razones por las que usted podría necesitar vazquez cesárea  · Mollie Sable cesárea podría programarse antes del parto si usted tuvo otra cesárea con benson último bebé  Ésta podría programarse si benson bebé no está en vazquez posición normal, o si usted está embarazada con más de 1 bebé  · Benson médico podría realizar vazquez cesárea de emergencia nolan el parto para evitar complicaciones que pongan en riesgo benson makenna o la del bebé  Vazquez cesárea podría realizarse si benson cerviz no se dilata después de varias horas de [place]  · Otras razones para realizar vazquez cesárea incluyen infecciones maternales o con la placenta  DESPUÉS DE SER DADO DE KATERIN:   Medicamentos:   · Podrían recetarle medicamentos para el dolor  Pregunte cómo tomarse jluis medicamento de [employer]  · El acetaminofeno  disminuye el dolor y la [place]  Está disponible sin receta médica  Pregunte la cantidad que debe barrington y con que frecuencia  [address]  El acetaminofeno puede provocar daño al hígado si no se tab correctamente  · Los AINEs  ayudan a disminuir la inflamación y el dolor o la fiebre  Jluis medicamento está disponible con o sin receta médica  Los AINEs puede provocar sangrado estomacal o problemas del Christella Gillian en ciertas personas  Si usted tab medicamentos anticoagulantes siempre  consulte con benson ginecólogo si los AINEs son seguros para usted  Siempre caitlin la etiqueta del medicamento y Palumbo Antonieta instrucciones  · [place] benson medicamento petar se le indique  Comuníquese con benson ginecólogo si usted piensa que benson medicamento no lo está ayudando o si tiene efectos secundarios  Infórmele si usted es alérgico a cualquier medicamento  Mantenga vazquez lista de los medicamentos, vitaminas y hierbas que tab  [address] cantidades, así petar cuándo y por qué los tab   Mal Downer con usted la lista o los envases de los medicamentos a anna citas de seguimiento  Lleve consigo la lista del medicamento en martha de vazquez emergencia  Programe vazquez marisa con benson ginecólogo petar se le indique:  Es posible que usted necesite regresar para que le quiten los puntos de sutura o las grapas  Escriba las preguntas que tenga para que recuerde hacerlas nolan anna citas  Cuidado de la herida:  Anita Askew cuidadosamente benson herida con [place] y agua diariamente  Mantenga benson Lenox Ruck y seca  Use ropa suelta y cómoda que no roce la herida  Pregunte a benson ginecólogo acerca de bañarse o ducharse  Ingiera suficientes líquidos:  Usted puede disminuir benson riesgo de un coágulo de jarrett si irina suficientes líquidos  Pregunte cuánto líquido debe ingerir cada día y cuáles son los mejores para usted  Limite la actividad hasta que usted se recupere completamente de la cirugía:   · Pregunte cuando estará usted hábil para manejar, subir escaleras, levantar objetos pesados y tener relaciones sexuales  · Pregunte cuando es apropiado que ejercite, y qué tipos de ejercicios puede realizar  Comience lentamente y ashley más conforme se sienta más froylan  Comuníquese con benson ginecólogo si:   · Usted presenta sangrado vaginal que empapa 1 toalla higiénica o más en 1 hora  · Usted tiene fiebre  · Benson incisión está inflamada, enrojecida o drena pus  · Usted tiene preguntas o inquietudes acerca de usted o de benson bebé  Busque atención médica inmediatamente o llame al 911 si:   · La jarrett empapa benson vendaje  · Anna puntos de sutura se desprenden  · Usted se siente mareado, con falta de aliento y tiene dolor en el pecho  · Usted tose jarrett  · Benson brazo o pierna se sienten calientes, sensibles, y dolorosos  Se podría gabo inflamado y arauz  · Usted tiene sangrado vaginal abundante (usted empapa vazquez toalla sanitaria en 1 a 2 horas consecutivas)    © 2014 [address] is for End User's use only and may not be sold, redistributed or otherwise used for commercial purposes  All illustrations and images included in CareNotes® are the copyrighted property of A MARGY A M , Inc  or [place]ot [place]  Esta información es sólo para uso en educación  Benson intención no es darle un consejo médico sobre enfermedades o tratamientos  Colsulte con benson Marita Garcia farmacéutico antes de seguir cualquier régimen médico para saber si es seguro y efectivo para usted  Cuidado personal después del parto   CUIDADO AMBULATORIO:   El periodo postparto  es el periodo de tiempo desde el momento de jose a tenzin hasta por lo menos 6 semanas  Nolan jluis tiempo usted puede experimentar muchos cambios físicos petar emocionales  Es muy importante entender que es lo normal y cuándo es necesario llamar a benson médico  También es importante saber petar cuidarse a sí misma nolan jluis periodo  Llame al 911 en martha de presentar lo siguiente:   · Usted empapa vazquez toalla higiénica en 15 minutos, tiene visión borrosa, piel húmeda o pálida y siente que se va a desmayar  · Usted se desmaya o pierde el conocimiento  · Benson corazón está latiendo más rápido de lo normal      · Usted tiene dificultad para respirar  · Usted expectora jarrett  Busque atención médica de inmediato si:   · Usted empapa 1 o más toallas higiénicas en 1 hora, o de benson vagina le salen unos coágulos de jarrett más grandes que vazquez moneda de 25 ORL[address]  · Benson pierna está Guilherme Moulds y [place] how[place] de lo normal      · Usted tiene dolor abdominal intenso  · Usted tiene un froylan dolor de yogesh o cambios en benson visión  · La incisión de la episiotomía o de la cesárea está taryn, inflamada, sangrando o supurando pus  · La incisión se abre  · Usted ve o escucha cosas que no existen o tiene pensamientos de [place] daño a sí misma o de lastimar a benson bebé  Comuníquese con benson obstetra o la partera sí:   · Usted tiene fiebre       · Usted le comienza o se le empeora el dolor en benson abdomen o vagina  · Usted sigue con tristeza de maternidad 10 guy después del parto  · Usted tiene dificultad para dormir  · Usted tiene flujo vaginal con mal olor  · Usted tiene dolor o ardor al orinar  · Usted no tiene evacuaciones intestinales por 3 días o más  · Usted tiene náuseas o está vomitando  · Usted tiene unos bultos duros o marc boogie por encima de avery senos  · Usted tiene los pezones cuarteados o le están sangrando  · Usted tiene preguntas o inquietudes acerca de benson condición o cuidado  Cambios físicos:  A continuación están los cambios normales después de jose a tenzin:  · Dolor en el área entre el ano y la vagina    · Dolor en el pecho    · Estreñimiento o hemorroides    · Golpes de calor o frío    · Sangrado o secreción vaginal    · Cólicos abdominales de leves a moderados    · Dificultad para controlar las deposiciones o la orina  Cambios emocionales:  Los siguientes son los síntomas de la tristeza de maternidad o de las emociones normales después de jose a tenzin  El cambio en avery emociones puede ser causado por un bajón en los niveles hormonales después del [place]  Si estos síntomas dubose más de 1 a 2 [address] de jose a tenzin, usted podría tener depresión posparto  · Sentirse irritable    · Sentimiento de tristeza    · Llora sin razón    · Sentimiento de ansiedad  Cuidado de los senos para mamás lactantes:  Usted puede tener los senos adoloridos nolan 3 a 6 días después de jose a tenzin  [place] sucede a medida que la [place] a llenar avery senos  Es posible que también tenga los senos adoloridos en martha que usted no esté dando de lactar a benson bebé con frecuencia  [place] lo siguiente para cuidar de avery senos:  · Aplique vazquez compresa húmeda y tibia en avery senos según las indicaciones  [place] puede servir para calmar el dolor en avery senos  Asegúrese que el paño no esté muy caliente antes de colocarlo en benson pecho       · Alimente al bebé o sáquese leche con frecuencia  [address] [place] puede prevenir la congestión de los conductos de [place]  Pregunte a benson médico con qué frecuencia debe alimentar a benson bebé o sacarse leche  · Dese masajes en los senos según las indicaciones  [address] puede ayudar a aumentar el flujo de [place]  [place]rote con suavidad de forma circular los senos antes de la lactancia  Es posible que necesite apretar con suavidad benson pecho o pezón para ayudar a que salga la leche  Usted también puede usar un extractor de Belt para ayudar a liberar la leche de avery senos  · Lávese los senos sólo con agua tibia  No use jabón en avery pezones  El jabón puede [employer]  · Aplique crema de lanolina en avery pezones según las indicaciones  La crema de lanonila puede servir para humectar benson piel y evitar que los pezones se resequen  Siempre  debe quitarse la crema de lanolina con agua tibia antes de darle pecho a benson bebé  · Use protectores para la lactancia en benson brasier o sostén  La [employer] salir de avery pezones cuando usted no está dando de lactar  Usted se puede colocar los protectores dentro de benson brasier para evitar que la Belt se traspase a benson ropa  Pregunte a benson médico sobre donde puede [employer] protectores para lactancia  · Solicite asistencia para la lactancia materna si lo necesita  Existen médicos que le pueden contestar las preguntas sobre la [place] y brindar [de-identified]  Pregunte a benson médico con quién puede asesorarse si necesita asistencia con la lactancia  El cuidado de los senos para las madres que dan biberón con formula:  La leche materna llenará avery pechos incluso si usted alimenta a benson bebé con leche de formula  A continuación unas cosas que puede hacer que ayudan a que deje de producir [place] y que avery senos se llenen y le provoque dolor:  · Use un sostén o brasier de soporte en todo momento  Un brasier deportivo o un sostén Chitra Rusty puede ayudar a suspender la producción de Belt       · Aplique hielo en cada seno por 15 a [facility] las indicaciones  Use un paquete de hielo o ponga hielo molido dentro de [employer]  Cúbrala con vazquez toalla  El hielo ayuda a encoger los conductos de [place]  · Evite que le caiga agua tibia en avery senos  El agua tibia hará que sea más fácil que la leche llene avery pechos  El la ducha póngase de espaldas al agua  · Limite la cantidad de tiempo que toca avery senos  [place] evitará que los senos se llenen de [place]  Cuidado del perineo:  El perineo es el área entre el recto y la vagina  Es normal tener inflamación y dolor en esta área después de jose a tenzin  Si a usted le hicieron vazquez episiotomía, benson médico le proporcionará instrucciones especiales  · Limpie el perineo después de ir al baño  [place] puede prevenir vazquez infección y [de-identified] para la cicatrización  Use vazquez botella de agua tibia con atomizador para limpiar el perineo  También puede rociar suavemente agua tibia contra el perineo mientras orina  Siempre debe limpiarse de adelante hacia atrás  · [facility]  Un baño de asiento puede servir para aliviar la inflamación y el dolor  Llene la deon o un recipiente con agua hasta que [place] avery caderas y siéntese en el agua  Use agua fría por 2 días después del parto  Luego use agua tibia  Pregunte a benson médico por más [employer] em de [place]  · Aplique compresas de hielo por las primeras 24 horas o [address] indicaciones  Use un guante médico y llénelo con hielo o compre compresas de hielo  Envuelva la compresa de hielo o el guante en vazquez toalla o paño pequeño  Coloque la compresa de hielo en el perineo por 20 minutos cada vez  · Siéntese en un cojín en forma de jamilah  [place] puede ayudar [employer] presión que se ejerce en benson perineo cuando se sienta  · Use toallitas medicadas o tome pastillas según las indicaciones  Benson médico puede indicarle que use toallitas de hamamelis   Usted puede [employer] de hamamelis en el refrigerados antes de aplicarlas en el perineo  También le puede indicar que tome un analgésico antiinflamatorio  Pregunte a benson médico con que frecuencia usar las pastillas o usar las toallitas con medicamento  · No vaya a nadar ni tome em en deon por 4 a 6 semanas o según las indicaciones  [place] servirá para evitar vazquez infección en benson útero o vagina   El cuidado intestinal y de la vejiga:  Олег Force puede barrington entre 3 a 5 días para tener vazquez evacuación intestinal después de jose a tenzin a benson bebé  Usted puede hacer lo siguiente para prevenir o controlar el estreñimiento y tener el control de avery intestinos o vejiga:  · [facility] indicaciones  Un ablandador fecal es un medicamento que hará que avery movimiento intestinal de materia fecal sea [address]  [place] puede prevenir o aliviar el estreñimiento  Un ablandador fecal además puede hacer que la evacuación intestinal duela menos  · [place] suficiente líquidos  Pregunte cuánto líquido debe barrington cada día y cuáles líquidos son los más adecuados para usted  Los líquidos pueden ayudar a prevenir el estreñimiento  · [place] alimentos ricos en fibras  [address] frutas, verduras, granos, frijoles y lentejas  Pregunte a benson médico cuál es la cantidad de fibra que necesita al día  La fibra puede prevenir el estreñimiento  · [place] los ejercicios de [place] según las indicaciones  Los ejercicios de [place] sirven para fortalecer los músculos que [employer] movimientos intestinales y la [place]  [place]dale a benson médico más [employer] ejercicios de [place]  · Aplique vazquez compresa o medicamento para la hemorroides según las indicaciones  [place] puede aliviar la inflamación y el dolor  Benson médico le puede indicar que use hielo o pañitos medicados para la hemorroides  También le puede indicar que se ashley em tibios de [place]  Pregunte a benson médico por mayor información sobre cómo controlar la hemorroides    Nutrición:  Diaz Jeet buena nutrición es importante en el periodo posparto  La nutrición ayuda a que usted regrese a benson peso saludable, aumenta el nivel de energía y agustin el estreñimiento  También sirve para que [employer] suficientes nutrientes y calorías si usted va a alimentar a benson irina con [place]  · Consuma alimentos saludables y variados  Los alimentos saludables incluyen frutas, verduras, pan integral, productos lácteos bajos en grasa, frijoles, ad magras y pescado  Usted puede [employer] 500 a 700 calorías adicionales al día si usted está dando de lactar a benson bebé  Puede que también necesite añadir proteína  · Limite los alimentos con azúcar añadida y grandes cantidades de [place]  [place] alimentos son altos en calorías y bajos en nutrientes saludables  Neris las etiquetas de los alimentos para que sepa cuál es la cantidad de azúcar y grasas que contiene los alimentos que quiere comer  · Google 8 a 10 vasos de agua al día  El agua le ayudará a producir suficiente leche para benson bebé  También le ayudará a prevenir el estreñimiento  Julissa un vaso de agua cada vez que amamanta a benson bebé  · 2 [address]  Pregunte a benson médico cuáles vitaminas necesita  · Limite la cafeína y el alcohol si usted está alimentando a benson bebé con [place]  Nenita Rust y el alcohol pueden pasar a la [place]  Nenita Rust y el alcohol pueden hacer que benson bebé esté molesto  [place] también interfiere con el sueño de benson bebé  Pregunte a benson médico si usted puede [place] alcohol o cafeína  Descanse y duerma:  Usted se puede sentir muy cansada en el periodo posparto  Dormir lo suficiente le ayudará a recuperarse y tener la energía para cuidar de benson bebé  Los siguientes pueden ayudarle a dormir y descansar:  · Duerma cuando benson bebé esté tomando la siesta  Benson bebé puede barrington varias siestas nolan el día  Descanse nolan [employer]  · [address]    Muchas Sharlyne Juventino venir a visitarla a usted y conocer al bebé  [place] a avery amigos y familiares que la visiten en diferentes días  [place] le dará tiempo para descansar  · No planee demasiadas cosas en un día  Deje los quehaceres de la casa para que tenga tiempo para descansar  Poco a poca ashley más cada día  · Solicite ayuda de familiares, amigos y vecinos  Pida que le ayuden a jhon la ropa, a limpiar o hacer mandados  También pregunte que alguien le cuide benson bebé mientras tab vazquez siesta o se relaja  Pídale a benson coleman que la ayude con el cuidado del bebé  Sáquese leche para que benson coleman pueda alimentar a benson bebé por la noche  Ejercicios después del parto:  Espere hasta que benson médico la autorice a hacer ejercicio  El ejercicio puede ayudarla adelgazar, aumentar benson niveles de energía y controlar benson estado de ánimo  También puede prevenir el estreñimiento y los coágulos  Emp[place] con un ejercicio leve petar caminar  [employer] a medida que tenga [place]  Es posible que necesite evitar hacer ejercicios para el abdomen por 1 a 2 semana después del parto  Hable con benson médico sobre un plan de ejercicios que sea adecuado para usted  Actividad sexual después del parto:   · No tenga relaciones sexuales hasta que benson médico lo autorice  Es posible que necesite esperar de 4 a 6 semanas antes de [employer] relación sexual  [place] puede evitar que contraiga vazquez infección y darle tiempo para recuperarse  · Benson ciclo menstrual puede comenzar tan pronto petar en 3 semanas después de jose a tenzin  Benson período puede demorarse si usted está dando de lactar a benson bebé  Usted puede quedar embarazada antes de que le venga benson primer período posparto  Consulte con benson médico sobre los anticonceptivos que son los adecuados para usted  Algunos tipos de anticonceptivos no son Recardo Reji  Para [de-identified] y más información:  Participe en un janet de apoyo para madres primerizas   Pídale ayuda a familiares y [facility] con los quehaceres de la casa, mandados y el cuidado de blue bebé  · Office of Women's Health, [place] Department of Health and Human Services  [address], [address] [employer] [place]  Trang King [address]  [address], [address] [employer] [place]  Trang King 178  Phone: [phone]  Web Address: www womenshealth gov  · [facility] Postpartum [address] [place] [address]  Web Address: [employer] be  [email or web address]  [email or web address]  Programe vazquez marisa de control con blue obstetra o partera según las indicaciones:  Usted tendrá que acudir [place] 2 a 6 semanas a vazquez marisa de control con blue médico después del parto  Escriba las preguntas que tenga para que recuerde hacerlas nolan avery citas  © 2017 [address] [facility] Information is for End User's use only and may not be sold, redistributed or otherwise used for commercial purposes  All illustrations and images included in CareNotes® are the copyrighted property of A D A M , Inc  or [place]  Esta información es sólo para uso en educación  Blue intención no es darle un consejo médico sobre enfermedades o tratamientos  Colsulte con blue Freeda Malone farmacéutico antes de seguir cualquier régimen médico para saber si es seguro y efectivo para usted

## 2020-09-25 NOTE — QUICK NOTE
Progress Note - OB/GYN  Roman Basilio 45 y o  female MRN: [id number]  Unit/Bed#: L&D 316-01 Encounter: [id number]      Subjective:  "I feel good"    Pain: none  Tolerating PO: yes  Voiding:  Miranda in place  Flatus: no  BM: no  Ambulating: no  Breastfeeding:  yae  Chest pain: no  Shortness of breath: no  Leg pain: no  Lochia: minimal    Vitals:   BP 93/63 (BP Location: Right arm)   Pulse 66   Temp 99 1 °F (37 3 °C) (Oral)   Resp 18   Ht 5' 6" (1 676 m)   Wt 84 kg (185 lb 3 oz)   LMP 01/05/2020   SpO2 98%   Breastfeeding Yes   BMI 29 89 kg/m²       Intake/Output Summary (Last 24 hours) at 9/25/2020 1625  Last data filed at 9/25/2020 1405  Gross per 24 hour   Intake 1900 ml   Output 1450 ml   Net 450 ml       Invasive Devices     Peripheral Intravenous Line            Peripheral IV 09/25/20 Right Forearm less than 1 day          Drain            Urethral Catheter Non-latex 16 Fr  less than 1 day                Physical Exam:   GEN: Roman Basilio appears well, alert and oriented x 3, pleasant and cooperative   ABDOMEN: soft, no tenderness, no distention, fundus @ umbilicus, Incision C/D/I  EXTREMITIES: SCDs on      Labs:   Admission on 09/25/2020   Component Date Value    WBC 09/25/2020 8 70     RBC 09/25/2020 3 81     Hemoglobin 09/25/2020 12 4     Hematocrit 09/25/2020 36 8     MCV 09/25/2020 97     MCH 09/25/2020 32 5     MCHC 09/25/2020 33 7     RDW 09/25/2020 12 4     Platelets 43/92/5811 170     MPV 09/25/2020 10 9     RPR 09/25/2020 Non-Reactive     ABO Grouping 09/25/2020 B     Rh Factor 09/25/2020 Positive     Antibody Screen 09/25/2020 Negative     Specimen Expiration Date 09/25/2020 [id number]     Strep Group B Ag 09/03/2020 Negative     POC Glucose 09/25/2020 87     pH, Cord Vicente 09/25/2020 7 338     pCO2, Cord Vicente 09/25/2020 42 2     pO2, Cord Vicente 09/25/2020 32 0     HCO3, Cord Vicente 09/25/2020 22 2     Base Exc, Cord Vicente 09/25/2020 -3 5*    O2 Cont, Cord [place] 2020 18 5     O2 HGB,VENOUS CORD 2020 74 0     pH, Cord Art 2020 7  267     pCO2, Cord Art 2020 52 4     pO2, Cord Art 2020 17 6     HCO3, Cord Art 2020 23 4     Base Exc, Cord Art 2020 -4 3*    O2 Content, Cord Art 2020 8 1     O2 Hgb, Arterial Cord 2020 34 5     WBC 2020 10 84*    RBC 2020 3 19*    Hemoglobin 2020 10 5*    Hematocrit 2020 31 4*    MCV 2020 98     MCH 2020 32 9     MCHC 2020 33 4     RDW 2020 12 5     Platelets  146*    MPV 2020 11 0          Patient Active Problem List   Diagnosis    Elderly multigravida, third trimester    39 weeks gestation of pregnancy    Prenatal care in third trimester    Insulin controlled gestational diabetes mellitus (GDM) in third trimester    Consultation for female sterilization    Hyperglycemia in pregnancy    Encounter for injection education    Excessive fetal growth affecting management of mother    Transverse lie of fetus    Status post primary low transverse  section        Assessment:  Postop Day #0 s/p 1LTCS and tubal, stable    Plan:    1) Postoperative care   Hgb 12 4g/dL --> 10 5   Urinary output 325cc, godinez in place   Encourage ambulation   Encourage breastfeeding   Anticipate discharge POD 2-3    Natalie Pretty MD  2020  4:25 PM

## 2020-09-25 NOTE — SOCIAL WORK
Kadie breast pump ordered through [facility] DME  MOB and FOB are aware they will get a bill for this  They confirmed they do not have insurance

## 2020-09-25 NOTE — ANESTHESIA PREPROCEDURE EVALUATION
Procedure:   SECTION () (N/A Uterus)    Relevant Problems   ANESTHESIA (within normal limits)      GYN   (+) Elderly multigravida, third trimester      Other   (+) Insulin controlled gestational diabetes mellitus (GDM) in third trimester        Physical Exam    Airway    Mallampati score: II  TM Distance: >3 FB  Neck ROM: full     Dental   No notable dental hx     Cardiovascular  Rhythm: regular, Rate: normal, Cardiovascular exam normal    Pulmonary  Pulmonary exam normal Breath sounds clear to auscultation,     Other Findings        Anesthesia Plan  ASA Score- 3     Anesthesia Type- spinal with ASA Monitors  Additional Monitors:   Airway Plan:     Comment: IT PF morphine  Plan Factors-Exercise tolerance (METS): >4 METS  Chart reviewed  Existing labs reviewed  Patient summary reviewed  Patient is not a current smoker  Patient instructed to abstain from smoking on day of procedure  Patient did not smoke on day of surgery  Obstructive sleep apnea risk education given perioperatively  Induction-     Postoperative Plan- Plan for postoperative opioid use  Informed Consent- Anesthetic plan and risks discussed with patient and spouse

## 2020-09-25 NOTE — PLAN OF CARE
Problem: PAIN - ADULT  Goal: Verbalizes/displays adequate comfort level or baseline comfort level  Description: Interventions:  - Encourage patient to monitor pain and request assistance  - Assess pain using appropriate pain scale  - Administer analgesics based on type and severity of pain and evaluate response  - Implement non-pharmacological measures as appropriate and evaluate response  - Consider cultural and social influences on pain and pain management  - Notify physician/advanced practitioner if interventions unsuccessful or patient reports new pain  Outcome: Progressing     Problem: INFECTION - ADULT  Goal: Absence or prevention of progression during hospitalization  Description: INTERVENTIONS:  - Assess and monitor for signs and symptoms of infection  - Monitor lab/diagnostic results  - Monitor all insertion sites, i e  indwelling lines, tubes, and drains  - Monitor nasal secretions for changes in amount and color  - [place] appropriate cooling/warming therapies per order  - Administer medications as ordered  - Instruct and encourage patient and family to use good hand hygiene technique  - Identify and instruct in appropriate isolation precautions for identified infection/condition  Outcome: Progressing     Problem: SAFETY ADULT  Goal: Patient will remain free of falls  Description: INTERVENTIONS:  - Assess patient frequently for physical needs  -  Identify cognitive and physical deficits and behaviors that affect risk of falls    -  [place] fall precautions as indicated by assessment   - Educate patient/family on patient safety including physical limitations  - Instruct patient to call for assistance with activity based on assessment  - Modify environment to reduce risk of injury  - Consider OT/PT consult to assist with strengthening/mobility  Outcome: Progressing  Goal: Maintain or return to baseline ADL function  Description: INTERVENTIONS:  -  Assess patient's ability to carry out ADLs; assess patient's baseline for ADL function and identify physical deficits which impact ability to perform ADLs (bathing, care of mouth/teeth, toileting, grooming, dressing, etc )  - Assess/evaluate cause of self-care deficits   - Assess range of motion  - Assess patient's mobility; develop plan if impaired  - Assess patient's need for assistive devices and provide as appropriate  - Encourage maximum independence but intervene and supervise when necessary  - Involve family in performance of ADLs  - Assess for home care needs following discharge   - Consider OT consult to assist with ADL evaluation and planning for discharge  - Provide patient education as appropriate  Outcome: Progressing  Goal: Maintain or return mobility status to optimal level  Description: INTERVENTIONS:  - Assess patient's baseline mobility status (ambulation, transfers, stairs, etc )    - Identify cognitive and physical deficits and behaviors that affect mobility  - Identify mobility aids required to assist with transfers and/or ambulation (gait belt, sit-to-stand, lift, walker, cane, etc )  - [place] fall precautions as indicated by assessment  - Record patient progress and toleration of activity level on Mobility SBAR; progress patient to next Phase/Stage  - Instruct patient to call for assistance with activity based on assessment  - Consider rehabilitation consult to assist with strengthening/weightbearing, etc   Outcome: Progressing     Problem: Knowledge Deficit  Goal: Patient/family/caregiver demonstrates understanding of disease process, treatment plan, medications, and discharge instructions  Description: Complete learning assessment and assess knowledge base    Interventions:  - Provide teaching at level of understanding  - Provide teaching via preferred learning methods  Outcome: Progressing     Problem: DISCHARGE PLANNING  Goal: Discharge to home or other facility with appropriate resources  Description: INTERVENTIONS:  - Identify barriers to discharge w/patient and caregiver  - Arrange for needed discharge resources and transportation as appropriate  - Identify discharge learning needs (meds, wound care, etc )  - Arrange for interpretive services to assist at discharge as needed  - Refer to Case Management Department for coordinating discharge planning if the patient needs post-hospital services based on physician/advanced practitioner order or complex needs related to functional status, cognitive ability, or social support system  Outcome: Progressing

## 2020-09-26 LAB
BASOPHILS # BLD AUTO: 0.02 THOUSANDS/ΜL (ref 0–0.1)
BASOPHILS NFR BLD AUTO: 0 % (ref 0–1)
EOSINOPHIL # BLD AUTO: 0.1 THOUSAND/ΜL (ref 0–0.61)
EOSINOPHIL NFR BLD AUTO: 1 % (ref 0–6)
ERYTHROCYTE [DISTWIDTH] IN BLOOD BY AUTOMATED COUNT: 12.8 % (ref 11.6–15.1)
HCT VFR BLD AUTO: 30.4 % (ref 34.8–46.1)
HGB BLD-MCNC: 10.1 G/DL (ref 11.5–15.4)
IMM GRANULOCYTES # BLD AUTO: 0.05 THOUSAND/UL (ref 0–0.2)
IMM GRANULOCYTES NFR BLD AUTO: 1 % (ref 0–2)
LYMPHOCYTES # BLD AUTO: 1.32 THOUSANDS/ΜL (ref 0.6–4.47)
LYMPHOCYTES NFR BLD AUTO: 16 % (ref 14–44)
MCH RBC QN AUTO: 33.1 PG (ref 26.8–34.3)
MCHC RBC AUTO-ENTMCNC: 33.2 G/DL (ref 31.4–37.4)
MCV RBC AUTO: 100 FL (ref 82–98)
MONOCYTES # BLD AUTO: 0.54 THOUSAND/ΜL (ref 0.17–1.22)
MONOCYTES NFR BLD AUTO: 6 % (ref 4–12)
NEUTROPHILS # BLD AUTO: 6.46 THOUSANDS/ΜL (ref 1.85–7.62)
NEUTS SEG NFR BLD AUTO: 76 % (ref 43–75)
NRBC BLD AUTO-RTO: 0 /100 WBCS
PLATELET # BLD AUTO: 131 THOUSANDS/UL (ref 149–390)
PMV BLD AUTO: 10.5 FL (ref 8.9–12.7)
RBC # BLD AUTO: 3.05 MILLION/UL (ref 3.81–5.12)
WBC # BLD AUTO: 8.49 THOUSAND/UL (ref 4.31–10.16)

## 2020-09-26 PROCEDURE — 99024 POSTOP FOLLOW-UP VISIT: CPT | Performed by: OBSTETRICS & GYNECOLOGY

## 2020-09-26 PROCEDURE — 85025 COMPLETE CBC W/AUTO DIFF WBC: CPT | Performed by: STUDENT IN AN ORGANIZED HEALTH CARE EDUCATION/TRAINING PROGRAM

## 2020-09-26 RX ADMIN — DOCUSATE SODIUM 100 MG: 100 CAPSULE, LIQUID FILLED ORAL at 08:46

## 2020-09-26 RX ADMIN — IBUPROFEN 600 MG: 600 TABLET, FILM COATED ORAL at 15:43

## 2020-09-26 RX ADMIN — IBUPROFEN 600 MG: 600 TABLET, FILM COATED ORAL at 08:46

## 2020-09-26 RX ADMIN — KETOROLAC TROMETHAMINE 30 MG: 30 INJECTION, SOLUTION INTRAMUSCULAR at 06:24

## 2020-09-26 RX ADMIN — IBUPROFEN 600 MG: 600 TABLET, FILM COATED ORAL at 23:35

## 2020-09-26 RX ADMIN — DOCUSATE SODIUM 100 MG: 100 CAPSULE, LIQUID FILLED ORAL at 18:25

## 2020-09-26 NOTE — PLAN OF CARE
Problem: PAIN - ADULT  Goal: Verbalizes/displays adequate comfort level or baseline comfort level  Description: Interventions:  - Encourage patient to monitor pain and request assistance  - Assess pain using appropriate pain scale  - Administer analgesics based on type and severity of pain and evaluate response  - Implement non-pharmacological measures as appropriate and evaluate response  - Consider cultural and social influences on pain and pain management  - Notify physician/advanced practitioner if interventions unsuccessful or patient reports new pain  Outcome: Progressing     Problem: INFECTION - ADULT  Goal: Absence or prevention of progression during hospitalization  Description: INTERVENTIONS:  - Assess and monitor for signs and symptoms of infection  - Monitor lab/diagnostic results  - Monitor all insertion sites, i e  indwelling lines, tubes, and drains  - Monitor nasal secretions for changes in amount and color  - [place] appropriate cooling/warming therapies per order  - Administer medications as ordered  - Instruct and encourage patient and family to use good hand hygiene technique  - Identify and instruct in appropriate isolation precautions for identified infection/condition  Outcome: Progressing     Problem: SAFETY ADULT  Goal: Patient will remain free of falls  Description: INTERVENTIONS:  - Assess patient frequently for physical needs  -  Identify cognitive and physical deficits and behaviors that affect risk of falls    -  [place] fall precautions as indicated by assessment   - Educate patient/family on patient safety including physical limitations  - Instruct patient to call for assistance with activity based on assessment  - Modify environment to reduce risk of injury  - Consider OT/PT consult to assist with strengthening/mobility  Outcome: Progressing  Goal: Maintain or return to baseline ADL function  Description: INTERVENTIONS:  -  Assess patient's ability to carry out ADLs; assess patient's baseline for ADL function and identify physical deficits which impact ability to perform ADLs (bathing, care of mouth/teeth, toileting, grooming, dressing, etc )  - Assess/evaluate cause of self-care deficits   - Assess range of motion  - Assess patient's mobility; develop plan if impaired  - Assess patient's need for assistive devices and provide as appropriate  - Encourage maximum independence but intervene and supervise when necessary  - Involve family in performance of ADLs  - Assess for home care needs following discharge   - Consider OT consult to assist with ADL evaluation and planning for discharge  - Provide patient education as appropriate  Outcome: Progressing  Goal: Maintain or return mobility status to optimal level  Description: INTERVENTIONS:  - Assess patient's baseline mobility status (ambulation, transfers, stairs, etc )    - Identify cognitive and physical deficits and behaviors that affect mobility  - Identify mobility aids required to assist with transfers and/or ambulation (gait belt, sit-to-stand, lift, walker, cane, etc )  - [place] fall precautions as indicated by assessment  - Record patient progress and toleration of activity level on Mobility SBAR; progress patient to next Phase/Stage  - Instruct patient to call for assistance with activity based on assessment  - Consider rehabilitation consult to assist with strengthening/weightbearing, etc   Outcome: Progressing     Problem: Knowledge Deficit  Goal: Patient/family/caregiver demonstrates understanding of disease process, treatment plan, medications, and discharge instructions  Description: Complete learning assessment and assess knowledge base    Interventions:  - Provide teaching at level of understanding  - Provide teaching via preferred learning methods  Outcome: Progressing     Problem: DISCHARGE PLANNING  Goal: Discharge to home or other facility with appropriate resources  Description: INTERVENTIONS:  - Identify barriers to discharge w/patient and caregiver  - Arrange for needed discharge resources and transportation as appropriate  - Identify discharge learning needs (meds, wound care, etc )  - Arrange for interpretive services to assist at discharge as needed  - Refer to Case Management Department for coordinating discharge planning if the patient needs post-hospital services based on physician/advanced practitioner order or complex needs related to functional status, cognitive ability, or social support system  Outcome: Progressing     Problem: POSTPARTUM  Goal: Experiences normal postpartum course  Description: INTERVENTIONS:  - Monitor maternal vital signs  - Assess uterine involution and lochia  Outcome: Progressing  Goal: Appropriate maternal -  bonding  Description: INTERVENTIONS:  - Identify family support  - Assess for appropriate maternal/infant bonding   -Encourage maternal/infant bonding opportunities  - Referral to  or  as needed  Outcome: Progressing  Goal: Establishment of infant feeding pattern  Description: INTERVENTIONS:  - Assess breast/bottle feeding  - Refer to lactation as needed  Outcome: Progressing  Goal: Incision(s), wounds(s) or drain site(s) healing without S/S of infection  Description: INTERVENTIONS  - Assess and document risk factors for skin impairment   - Assess and document dressing, incision, wound bed, drain sites and surrounding tissue  - Consider nutrition services referral as needed  - Oral mucous membranes remain intact  - Provide patient/ family education  Outcome: Progressing

## 2020-09-26 NOTE — LACTATION NOTE
This note was copied from a baby's chart  Met with parents to encourage breast feeding  Changed baby for small BM  Assisted mom to place baby skin to skin in football hold on right and left breast  Baby able to latch deeply for few sucks several times  Baby passing flatus  Mom able to hand expressed several drops into baby's mouth  Encouraged mom to maintain skin to skin and observe for early feeding cues and attempt to latch  Early feeding cues reviewed  Encouraged parents to call for assistance, questions, and concerns about breastfeeding  Extension provided

## 2020-09-26 NOTE — PROGRESS NOTES
Progress Note - OB/GYN  Diana Lewis 45 y o  female MRN: [id number]  Unit/Bed#: L&D 316-01 Encounter: [id number]      Assessment:  Postop Day #1 s/p 1LTCS and BTL, stable    Plan:    1) Postoperative care-   - QBL 1124 cc (EBL 500cc), Hgb 12 4g/dL --> 10 5  -Urinary output 5 4cc/kg/hr, godinez removed this AM--> f/u VT  - Encourage ambulation  - Encourage breastfeeding  - DVT ppx: Lovenox 40 mg daily   - Anticipate discharge POD#3      Chief Complaint:    Post delivery  Patient is doing well  Lochia WNL  Pain well controlled      Subjective    Pain: well controlled   Tolerating PO: yes  Voiding: yes, Godinez removed this AM  Flatus: yes  BM: no  Ambulating: yes  Chest pain: no  Shortness of breath: no  Leg pain: no  Lochia: minimal     Vitals:   BP (!) 88/53 (BP Location: Left arm)   Pulse 65   Temp 98 5 °F (36 9 °C) (Oral)   Resp 14   Ht 5' 6" (1 676 m)   Wt 84 kg (185 lb 3 oz)   LMP 01/05/2020   SpO2 96%   Breastfeeding Yes   BMI 29 89 kg/m²       Intake/Output Summary (Last 24 hours) at 9/26/2020 0537  Last data filed at 9/26/2020 0300  Gross per 24 hour   Intake 1900 ml   Output 2900 ml   Net -1000 ml       Invasive Devices     Peripheral Intravenous Line            Peripheral IV 09/25/20 Right Forearm less than 1 day          Drain            Urethral Catheter Non-latex 16 Fr  less than 1 day                Physical Exam:   GEN: Diana Lewis appears well, alert and oriented x 3, pleasant and cooperative   ABDOMEN: soft, no tenderness, no distention, fundus firm and at umbilicus, Incision C/D/I  EXTREMITIES: SCDs on      Labs:   Admission on 09/25/2020   Component Date Value    WBC 09/25/2020 8 70     RBC 09/25/2020 3 81     Hemoglobin 09/25/2020 12 4     Hematocrit 09/25/2020 36 8     MCV 09/25/2020 97     MCH 09/25/2020 32 5     MCHC 09/25/2020 33 7     RDW 09/25/2020 12 4     Platelets 13/76/0596 170     MPV 09/25/2020 10 9     RPR 09/25/2020 Non-Reactive     ABO Grouping 2020 B     Rh Factor 2020 Positive     Antibody Screen 2020 Negative     Specimen Expiration Date 2020 81207276     Strep Group B Ag 2020 Negative     POC Glucose 2020 87     pH, Cord Vicente 2020 7 338     pCO2, Cord Vicente 2020 42 2     pO2, Cord Vicente 2020 32 0     HCO3, Cord Vicente 2020 22 2    [facility] Exc, Cord Vicente 2020 -3 5*    O2 Cont, Cord Vicente 2020 18 5     O2 HGB,VENOUS CORD 2020 74 0     pH, Cord Art 2020 7  267     pCO2, Cord Art 2020 52 4     pO2, Cord Art 2020 17 6     HCO3, Cord Art 2020 23 4     Base Exc, Cord Art 2020 -4 3*    O2 Content, Cord Art 2020 8 1     O2 Hgb, Arterial Cord 2020 34 5     WBC 2020 10 84*    RBC 2020 3 19*    Hemoglobin 2020 10 5*    Hematocrit 2020 31 4*    MCV 2020 98     MCH 2020 32 9     MCHC 2020 33 4     RDW 2020 12 5     Platelets  146*    MPV 2020 11 0          Patient Active Problem List   Diagnosis    Elderly multigravida, third trimester    39 weeks gestation of pregnancy    Prenatal care in third trimester    Insulin controlled gestational diabetes mellitus (GDM) in third trimester    Consultation for female sterilization    Hyperglycemia in pregnancy    Encounter for injection education    Excessive fetal growth affecting management of mother    Transverse lie of fetus    Status post primary low transverse  section          Marlena Lamar MD  2020  5:37 AM

## 2020-09-27 VITALS
OXYGEN SATURATION: 96 % | SYSTOLIC BLOOD PRESSURE: 111 MMHG | HEIGHT: 66 IN | BODY MASS INDEX: 29.76 KG/M2 | TEMPERATURE: 97.6 F | RESPIRATION RATE: 18 BRPM | WEIGHT: 185.19 LBS | HEART RATE: 77 BPM | DIASTOLIC BLOOD PRESSURE: 55 MMHG

## 2020-09-27 PROCEDURE — 99024 POSTOP FOLLOW-UP VISIT: CPT | Performed by: STUDENT IN AN ORGANIZED HEALTH CARE EDUCATION/TRAINING PROGRAM

## 2020-09-27 RX ORDER — SIMETHICONE 80 MG
80 TABLET,CHEWABLE ORAL 4 TIMES DAILY PRN
Qty: 30 TABLET | Refills: 0
Start: 2020-09-27

## 2020-09-27 RX ORDER — DOCUSATE SODIUM 100 MG/1
100 CAPSULE, LIQUID FILLED ORAL 2 TIMES DAILY
Qty: 10 CAPSULE | Refills: 0 | Status: SHIPPED | OUTPATIENT
Start: 2020-09-27

## 2020-09-27 RX ORDER — ACETAMINOPHEN 325 MG/1
650 TABLET ORAL EVERY 6 HOURS PRN
Qty: 30 TABLET | Refills: 0 | Status: SHIPPED | OUTPATIENT
Start: 2020-09-27

## 2020-09-27 RX ORDER — IBUPROFEN 600 MG/1
600 TABLET ORAL EVERY 6 HOURS PRN
Qty: 60 TABLET | Refills: 0 | Status: SHIPPED | OUTPATIENT
Start: 2020-09-27

## 2020-09-27 RX ORDER — OXYCODONE HYDROCHLORIDE 5 MG/1
5 TABLET ORAL EVERY 4 HOURS PRN
Qty: 10 TABLET | Refills: 0 | Status: CANCELLED | OUTPATIENT
Start: 2020-09-27

## 2020-09-27 RX ADMIN — IBUPROFEN 600 MG: 600 TABLET, FILM COATED ORAL at 06:26

## 2020-09-27 RX ADMIN — DOCUSATE SODIUM 100 MG: 100 CAPSULE, LIQUID FILLED ORAL at 08:31

## 2020-09-27 RX ADMIN — SIMETHICONE CHEW TAB 80 MG 80 MG: 80 TABLET ORAL at 08:31

## 2020-09-27 NOTE — PROGRESS NOTES
Progress Note - OB/GYN  Zoë Ross 45 y o  female MRN: [id number]  Unit/Bed#: L&D 316-01 Encounter: [id number]      Assessment:  Postop Day #2 s/p 1LTCS and BTL, stable    Plan:    1) Postoperative care-   - QBL 1124 cc (EBL 500cc), Hgb 12 4g/dL --> 10 5  - Miranda removed, voiding appropriately   - Encourage ambulation  - Encourage breastfeeding  - DVT ppx: Lovenox 40 mg daily   - Patient desires discharge today, pending baby       Chief Complaint:    Post delivery  Patient is doing well  Lochia WNL  She is reporting some lower abdominal gas pain, though she does state that she has been able to pass some gas  She has not had a BM but does not feel as though she needs to go       Subjective    Pain: well controlled   Tolerating PO: yes  Voiding: yes, Miranda removed this AM  Flatus: yes  BM: no  Ambulating: yes  Chest pain: no  Shortness of breath: no  Leg pain: no  Lochia: minimal     Vitals:   /77 (BP Location: Left arm)   Pulse 65   Temp 98 3 °F (36 8 °C) (Oral)   Resp 20   Ht 5' 6" (1 676 m)   Wt 84 kg (185 lb 3 oz)   LMP 01/05/2020   SpO2 96%   Breastfeeding Yes   BMI 29 89 kg/m²       Intake/Output Summary (Last 24 hours) at 9/27/2020 0700  Last data filed at 9/26/2020 1201  Gross per 24 hour   Intake --   Output 1000 ml   Net -1000 ml       Invasive Devices     Peripheral Intravenous Line            Peripheral IV 09/25/20 Right Forearm 2 days                Physical Exam:   GEN: Zoë Ross appears well, alert and oriented x 3, pleasant and cooperative   ABDOMEN: soft, no tenderness, no distention, fundus firm and at umbilicus, Incision C/D/I  EXTREMITIES: SCDs on      Labs:   Admission on 09/25/2020   Component Date Value    WBC 09/25/2020 8 70     RBC 09/25/2020 3 81     Hemoglobin 09/25/2020 12 4     Hematocrit 09/25/2020 36 8     MCV 09/25/2020 97     MCH 09/25/2020 32 5     MCHC 09/25/2020 33 7     RDW 09/25/2020 12 4     Platelets 91/04/1623 170     MPV 09/25/2020 10 9     RPR 09/25/2020 Non-Reactive     ABO Grouping 09/25/2020 B     Rh Factor 09/25/2020 Positive     Antibody Screen 09/25/2020 Negative     Specimen Expiration Date 09/25/2020 [id number]     Strep Group B Ag 09/03/2020 Negative     POC Glucose 09/25/2020 87     pH, Cord Vicente 09/25/2020 7 338     pCO2, Cord Vicente 09/25/2020 42 2     pO2, Cord Vicente 09/25/2020 32 0     HCO3, Cord Vciente 09/25/2020 22 2    [facility] Exc, Cord Vicente 09/25/2020 -3 5*    O2 Cont, Cord Vicente 09/25/2020 18 5     O2 HGB,VENOUS CORD 09/25/2020 74 0     pH, Cord Art 09/25/2020 7  267     pCO2, Cord Art 09/25/2020 52 4     pO2, Cord Art 09/25/2020 17 6     HCO3, Cord Art 09/25/2020 23 4     Base Exc, Cord Art 09/25/2020 -4 3*    O2 Content, Cord Art 09/25/2020 8 1     O2 Hgb, Arterial Cord 09/25/2020 34 5     WBC 09/25/2020 10 84*    RBC 09/25/2020 3 19*    Hemoglobin 09/25/2020 10 5*    Hematocrit 09/25/2020 31 4*    MCV 09/25/2020 98     MCH 09/25/2020 32 9     MCHC 09/25/2020 33 4     RDW 09/25/2020 12 5     Platelets 75/45/9295 146*    MPV 09/25/2020 11 0     WBC 09/26/2020 8 49     RBC 09/26/2020 3 05*    Hemoglobin 09/26/2020 10 1*    Hematocrit 09/26/2020 30 4*    MCV 09/26/2020 100*    MCH 09/26/2020 33 1     MCHC 09/26/2020 33 2     RDW 09/26/2020 12 8     MPV 09/26/2020 10 5     Platelets 04/97/6091 131*    nRBC 09/26/2020 0     Neutrophils Relative 09/26/2020 76*    Immat GRANS % 09/26/2020 1     Lymphocytes Relative 09/26/2020 16     Monocytes Relative 09/26/2020 6     Eosinophils Relative 09/26/2020 1     Basophils Relative 09/26/2020 0     Neutrophils Absolute 09/26/2020 6 46     Immature Grans Absolute 09/26/2020 0 05     Lymphocytes Absolute 09/26/2020 1 32     Monocytes Absolute 09/26/2020 0 54     Eosinophils Absolute 09/26/2020 0 10     Basophils Absolute 09/26/2020 0 02          Patient Active Problem List   Diagnosis    Elderly multigravida, third trimester    39 weeks gestation of pregnancy    Prenatal care in third trimester    Insulin controlled gestational diabetes mellitus (GDM) in third trimester    Consultation for female sterilization    Hyperglycemia in pregnancy    Encounter for injection education    Excessive fetal growth affecting management of mother    Transverse lie of fetus    Status post primary low transverse  section          Nancy Chandler MD  2020  7:00 AM

## 2020-09-27 NOTE — PLAN OF CARE
Problem: PAIN - ADULT  Goal: Verbalizes/displays adequate comfort level or baseline comfort level  Description: Interventions:  - Encourage patient to monitor pain and request assistance  - Assess pain using appropriate pain scale  - Administer analgesics based on type and severity of pain and evaluate response  - Implement non-pharmacological measures as appropriate and evaluate response  - Consider cultural and social influences on pain and pain management  - Notify physician/advanced practitioner if interventions unsuccessful or patient reports new pain  Outcome: Progressing     Problem: INFECTION - ADULT  Goal: Absence or prevention of progression during hospitalization  Description: INTERVENTIONS:  - Assess and monitor for signs and symptoms of infection  - Monitor lab/diagnostic results  - Monitor all insertion sites, i e  indwelling lines, tubes, and drains  - Monitor nasal secretions for changes in amount and color  - [place] appropriate cooling/warming therapies per order  - Administer medications as ordered  - Instruct and encourage patient and family to use good hand hygiene technique  - Identify and instruct in appropriate isolation precautions for identified infection/condition  Outcome: Progressing     Problem: SAFETY ADULT  Goal: Patient will remain free of falls  Description: INTERVENTIONS:  - Assess patient frequently for physical needs  -  Identify cognitive and physical deficits and behaviors that affect risk of falls    -  [place] fall precautions as indicated by assessment   - Educate patient/family on patient safety including physical limitations  - Instruct patient to call for assistance with activity based on assessment  - Modify environment to reduce risk of injury  - Consider OT/PT consult to assist with strengthening/mobility  Outcome: Progressing  Goal: Maintain or return to baseline ADL function  Description: INTERVENTIONS:  -  Assess patient's ability to carry out ADLs; assess patient's baseline for ADL function and identify physical deficits which impact ability to perform ADLs (bathing, care of mouth/teeth, toileting, grooming, dressing, etc )  - Assess/evaluate cause of self-care deficits   - Assess range of motion  - Assess patient's mobility; develop plan if impaired  - Assess patient's need for assistive devices and provide as appropriate  - Encourage maximum independence but intervene and supervise when necessary  - Involve family in performance of ADLs  - Assess for home care needs following discharge   - Consider OT consult to assist with ADL evaluation and planning for discharge  - Provide patient education as appropriate  Outcome: Progressing  Goal: Maintain or return mobility status to optimal level  Description: INTERVENTIONS:  - Assess patient's baseline mobility status (ambulation, transfers, stairs, etc )    - Identify cognitive and physical deficits and behaviors that affect mobility  - Identify mobility aids required to assist with transfers and/or ambulation (gait belt, sit-to-stand, lift, walker, cane, etc )  - [place] fall precautions as indicated by assessment  - Record patient progress and toleration of activity level on Mobility SBAR; progress patient to next Phase/Stage  - Instruct patient to call for assistance with activity based on assessment  - Consider rehabilitation consult to assist with strengthening/weightbearing, etc   Outcome: Progressing     Problem: Knowledge Deficit  Goal: Patient/family/caregiver demonstrates understanding of disease process, treatment plan, medications, and discharge instructions  Description: Complete learning assessment and assess knowledge base    Interventions:  - Provide teaching at level of understanding  - Provide teaching via preferred learning methods  Outcome: Progressing     Problem: DISCHARGE PLANNING  Goal: Discharge to home or other facility with appropriate resources  Description: INTERVENTIONS:  - Identify barriers to discharge w/patient and caregiver  - Arrange for needed discharge resources and transportation as appropriate  - Identify discharge learning needs (meds, wound care, etc )  - Arrange for interpretive services to assist at discharge as needed  - Refer to Case Management Department for coordinating discharge planning if the patient needs post-hospital services based on physician/advanced practitioner order or complex needs related to functional status, cognitive ability, or social support system  Outcome: Progressing     Problem: POSTPARTUM  Goal: Experiences normal postpartum course  Description: INTERVENTIONS:  - Monitor maternal vital signs  - Assess uterine involution and lochia  Outcome: Progressing  Goal: Appropriate maternal -  bonding  Description: INTERVENTIONS:  - Identify family support  - Assess for appropriate maternal/infant bonding   -Encourage maternal/infant bonding opportunities  - Referral to  or  as needed  Outcome: Progressing  Goal: Establishment of infant feeding pattern  Description: INTERVENTIONS:  - Assess breast/bottle feeding  - Refer to lactation as needed  Outcome: Progressing  Goal: Incision(s), wounds(s) or drain site(s) healing without S/S of infection  Description: INTERVENTIONS  - Assess and document risk factors for skin impairment   - Assess and document dressing, incision, wound bed, drain sites and surrounding tissue  - Consider nutrition services referral as needed  - Oral mucous membranes remain intact  - Provide patient/ family education  Outcome: Progressing

## 2020-09-30 ENCOUNTER — TELEPHONE (OUTPATIENT)
Dept: OBGYN CLINIC | Facility: CLINIC | Age: 40
End: 2020-09-30

## 2020-10-01 ENCOUNTER — POSTPARTUM VISIT (OUTPATIENT)
Dept: OBGYN CLINIC | Facility: CLINIC | Age: 40
End: 2020-10-01

## 2020-10-01 VITALS
TEMPERATURE: 98.4 F | HEART RATE: 77 BPM | WEIGHT: 169.6 LBS | DIASTOLIC BLOOD PRESSURE: 82 MMHG | BODY MASS INDEX: 27.37 KG/M2 | SYSTOLIC BLOOD PRESSURE: 125 MMHG

## 2020-10-01 DIAGNOSIS — Z98.891 STATUS POST PRIMARY LOW TRANSVERSE CESAREAN SECTION: Primary | ICD-10-CM

## 2020-10-01 PROCEDURE — 99214 OFFICE O/P EST MOD 30 MIN: CPT | Performed by: OBSTETRICS & GYNECOLOGY

## 2020-10-03 LAB — PLACENTA IN STORAGE: NORMAL

## 2020-10-21 ENCOUNTER — TELEPHONE (OUTPATIENT)
Dept: OBGYN CLINIC | Facility: CLINIC | Age: 40
End: 2020-10-21

## 2020-10-28 ENCOUNTER — POSTPARTUM VISIT (OUTPATIENT)
Dept: OBGYN CLINIC | Facility: CLINIC | Age: 40
End: 2020-10-28

## 2020-10-28 VITALS
HEIGHT: 66 IN | TEMPERATURE: 98 F | HEART RATE: 59 BPM | DIASTOLIC BLOOD PRESSURE: 88 MMHG | WEIGHT: 161 LBS | BODY MASS INDEX: 25.88 KG/M2 | SYSTOLIC BLOOD PRESSURE: 125 MMHG

## 2020-10-28 DIAGNOSIS — D50.8 OTHER IRON DEFICIENCY ANEMIA: Primary | ICD-10-CM

## 2020-10-28 DIAGNOSIS — O24.414 INSULIN CONTROLLED GESTATIONAL DIABETES MELLITUS (GDM) IN THIRD TRIMESTER: ICD-10-CM

## 2020-10-28 PROCEDURE — 99213 OFFICE O/P EST LOW 20 MIN: CPT | Performed by: OBSTETRICS & GYNECOLOGY

## 2020-10-28 RX ORDER — FERROUS SULFATE TAB EC 324 MG (65 MG FE EQUIVALENT) 324 (65 FE) MG
324 TABLET DELAYED RESPONSE ORAL
Qty: 90 TABLET | Refills: 3 | Status: SHIPPED | OUTPATIENT
Start: 2020-10-28

## 2021-03-30 DIAGNOSIS — Z23 ENCOUNTER FOR IMMUNIZATION: ICD-10-CM

## 2021-04-03 ENCOUNTER — IMMUNIZATIONS (OUTPATIENT)
Dept: FAMILY MEDICINE CLINIC | Facility: HOSPITAL | Age: 41
End: 2021-04-03

## 2021-04-03 DIAGNOSIS — Z23 ENCOUNTER FOR IMMUNIZATION: Primary | ICD-10-CM

## 2021-04-03 PROCEDURE — 0001A SARS-COV-2 / COVID-19 MRNA VACCINE (PFIZER-BIONTECH) 30 MCG: CPT

## 2021-04-03 PROCEDURE — 91300 SARS-COV-2 / COVID-19 MRNA VACCINE (PFIZER-BIONTECH) 30 MCG: CPT

## 2021-04-25 ENCOUNTER — IMMUNIZATIONS (OUTPATIENT)
Dept: FAMILY MEDICINE CLINIC | Facility: HOSPITAL | Age: 41
End: 2021-04-25

## 2021-04-25 DIAGNOSIS — Z23 ENCOUNTER FOR IMMUNIZATION: Primary | ICD-10-CM

## 2021-04-25 PROCEDURE — 0002A SARS-COV-2 / COVID-19 MRNA VACCINE (PFIZER-BIONTECH) 30 MCG: CPT

## 2021-04-25 PROCEDURE — 91300 SARS-COV-2 / COVID-19 MRNA VACCINE (PFIZER-BIONTECH) 30 MCG: CPT

## (undated) DEVICE — SUT MONOCRYL 4-0 PS-2 27 IN Y426H

## (undated) DEVICE — GLOVE SRG BIOGEL ECLIPSE 7

## (undated) DEVICE — SUT PLAIN 3-0 CT-1 27 IN 842H

## (undated) DEVICE — SCD SEQUENTIAL COMPRESSION COMFORT SLEEVE MEDIUM KNEE LENGTH: Brand: KENDALL SCD

## (undated) DEVICE — SUT VICRYL 0 CT 36 IN J958H

## (undated) DEVICE — CHLORAPREP HI-LITE 26ML ORANGE

## (undated) DEVICE — ABG MICROSTICKS SAFETY

## (undated) DEVICE — WOUND RETRACTOR AND PROTECTOR: Brand: ALEXIS O WOUND PROTECTOR-RETRACTOR

## (undated) DEVICE — PACK C-SECTION PBDS

## (undated) DEVICE — GLOVE INDICATOR PI UNDERGLOVE SZ 7.5 BLUE